# Patient Record
Sex: FEMALE | Race: ASIAN | NOT HISPANIC OR LATINO | URBAN - METROPOLITAN AREA
[De-identification: names, ages, dates, MRNs, and addresses within clinical notes are randomized per-mention and may not be internally consistent; named-entity substitution may affect disease eponyms.]

---

## 2023-07-19 ENCOUNTER — OFFICE VISIT (OUTPATIENT)
Dept: PRIMARY CARE | Facility: CLINIC | Age: 28
End: 2023-07-19
Payer: MEDICAID

## 2023-07-19 VITALS
DIASTOLIC BLOOD PRESSURE: 69 MMHG | BODY MASS INDEX: 24.19 KG/M2 | HEIGHT: 59 IN | RESPIRATION RATE: 16 BRPM | OXYGEN SATURATION: 95 % | WEIGHT: 120 LBS | HEART RATE: 80 BPM | SYSTOLIC BLOOD PRESSURE: 107 MMHG | TEMPERATURE: 97.6 F

## 2023-07-19 DIAGNOSIS — J01.00 ACUTE NON-RECURRENT MAXILLARY SINUSITIS: Primary | ICD-10-CM

## 2023-07-19 DIAGNOSIS — E55.9 VITAMIN D DEFICIENCY: ICD-10-CM

## 2023-07-19 DIAGNOSIS — E53.8 VITAMIN B 12 DEFICIENCY: ICD-10-CM

## 2023-07-19 DIAGNOSIS — Z00.00 ENCOUNTER FOR ANNUAL GENERAL MEDICAL EXAMINATION WITHOUT ABNORMAL FINDINGS IN ADULT: ICD-10-CM

## 2023-07-19 DIAGNOSIS — R53.83 FATIGUE, UNSPECIFIED TYPE: ICD-10-CM

## 2023-07-19 DIAGNOSIS — D64.9 ANEMIA, UNSPECIFIED TYPE: ICD-10-CM

## 2023-07-19 PROBLEM — N92.6 IRREGULAR MENSTRUAL CYCLE: Status: ACTIVE | Noted: 2023-07-19

## 2023-07-19 PROBLEM — J30.9 ALLERGIC RHINITIS: Status: ACTIVE | Noted: 2023-07-19

## 2023-07-19 PROBLEM — L25.9 CONTACT DERMATITIS: Status: ACTIVE | Noted: 2023-07-19

## 2023-07-19 PROBLEM — K21.00 GASTROESOPHAGEAL REFLUX DISEASE WITH ESOPHAGITIS: Status: ACTIVE | Noted: 2023-07-19

## 2023-07-19 PROCEDURE — 99214 OFFICE O/P EST MOD 30 MIN: CPT | Performed by: FAMILY MEDICINE

## 2023-07-19 PROCEDURE — 96372 THER/PROPH/DIAG INJ SC/IM: CPT | Performed by: FAMILY MEDICINE

## 2023-07-19 PROCEDURE — 1036F TOBACCO NON-USER: CPT | Performed by: FAMILY MEDICINE

## 2023-07-19 RX ORDER — AMOXICILLIN AND CLAVULANATE POTASSIUM 875; 125 MG/1; MG/1
875 TABLET, FILM COATED ORAL 2 TIMES DAILY
Qty: 20 TABLET | Refills: 0 | Status: SHIPPED | OUTPATIENT
Start: 2023-07-19 | End: 2023-07-29

## 2023-07-19 RX ORDER — CYANOCOBALAMIN 1000 UG/ML
1000 INJECTION, SOLUTION INTRAMUSCULAR; SUBCUTANEOUS ONCE
Status: COMPLETED | OUTPATIENT
Start: 2023-07-19 | End: 2023-07-19

## 2023-07-19 RX ORDER — ACETAMINOPHEN, DEXTROMETHORPHAN HYDROBROMIDE, GUAIFENESIN, AND PHENYLEPHRINE HYDROCHLORIDE 325; 10; 200; 5 MG/1; MG/1; MG/1; MG/1
1 CAPSULE, LIQUID FILLED ORAL EVERY 6 HOURS PRN
Qty: 20 CAPSULE | Refills: 0 | Status: SHIPPED | OUTPATIENT
Start: 2023-07-19 | End: 2023-08-23 | Stop reason: ALTCHOICE

## 2023-07-19 RX ADMIN — CYANOCOBALAMIN 1000 MCG: 1000 INJECTION, SOLUTION INTRAMUSCULAR; SUBCUTANEOUS at 15:13

## 2023-07-19 ASSESSMENT — ENCOUNTER SYMPTOMS
SHORTNESS OF BREATH: 0
VOICE CHANGE: 0
WHEEZING: 0
MYALGIAS: 0
ARTHRALGIAS: 0
APPETITE CHANGE: 0
PALPITATIONS: 0
UNEXPECTED WEIGHT CHANGE: 0
EYE REDNESS: 0
CONSTIPATION: 0
ACTIVITY CHANGE: 0
DIAPHORESIS: 0
BLOOD IN STOOL: 0
FREQUENCY: 0
NECK STIFFNESS: 0
HEMATURIA: 0
WOUND: 0
SLEEP DISTURBANCE: 0
HEADACHES: 1
DIARRHEA: 0
SINUS PAIN: 0
ABDOMINAL PAIN: 0
SINUS PRESSURE: 0
POLYPHAGIA: 0
DIZZINESS: 0
POLYDIPSIA: 0
EYE ITCHING: 0
FLANK PAIN: 0
EYE DISCHARGE: 0
BACK PAIN: 0
CHILLS: 0
JOINT SWELLING: 0
SORE THROAT: 0
AGITATION: 0
LOSS OF SENSATION IN FEET: 0
DEPRESSION: 0
CHEST TIGHTNESS: 0
FEVER: 0
TROUBLE SWALLOWING: 0
COLOR CHANGE: 0
NAUSEA: 0
BRUISES/BLEEDS EASILY: 0
EYE PAIN: 0
ADENOPATHY: 0
NERVOUS/ANXIOUS: 0
VOMITING: 0
COUGH: 0
FATIGUE: 1
DYSURIA: 0
OCCASIONAL FEELINGS OF UNSTEADINESS: 0
FACIAL SWELLING: 0
RHINORRHEA: 0

## 2023-07-19 ASSESSMENT — PATIENT HEALTH QUESTIONNAIRE - PHQ9
SUM OF ALL RESPONSES TO PHQ9 QUESTIONS 1 AND 2: 0
1. LITTLE INTEREST OR PLEASURE IN DOING THINGS: NOT AT ALL
2. FEELING DOWN, DEPRESSED OR HOPELESS: NOT AT ALL

## 2023-07-19 NOTE — PATIENT INSTRUCTIONS
"Return in 3-4 weeks for physical exam, Pap, & B12 injection.  Return in 3-4 weeks for blood work, no food for 10 hours before.     Take warm, steamy showers to help with sinus congestion.  Try \"neti pot\" at drugstore to help with sinus congestion.     "

## 2023-07-19 NOTE — PROGRESS NOTES
Subjective   Patient ID: Mckinley De La Rosa is a 28 y.o. female who presents for Headache and Fatigue.  Today she is accompanied by accompanied by sibling.     Over the last 2 weeks she has felt tired no matter how much she sleeps. She has also been experiencing headaches and neck pain, relieved by tylenol. Denies fever, sore throat, cough, congestion, diarrhea. Denies recent contacts.  Today she feels better. Denies any medication changes or life/situation changes. No new stressors.     Headache   This is a new problem. The current episode started 1 to 4 weeks ago. The problem occurs daily. The problem has been gradually improving. The pain is located in the Occipital region. The pain does not radiate. The pain quality is similar to prior headaches. The quality of the pain is described as aching. The pain is at a severity of 5/10. The pain is mild. Pertinent negatives include no abdominal pain, back pain, coughing, dizziness, ear pain, eye pain, eye redness, fever, hearing loss, nausea, rhinorrhea, sinus pressure, sore throat, tinnitus or vomiting.   Fatigue  Associated symptoms include fatigue and headaches. Pertinent negatives include no abdominal pain, arthralgias, chest pain, chills, congestion, coughing, diaphoresis, fever, joint swelling, myalgias, nausea, rash, sore throat or vomiting.       Review of Systems   Constitutional:  Positive for fatigue. Negative for activity change, appetite change, chills, diaphoresis, fever and unexpected weight change.   HENT:  Negative for congestion, dental problem, drooling, ear discharge, ear pain, facial swelling, hearing loss, nosebleeds, postnasal drip, rhinorrhea, sinus pressure, sinus pain, sneezing, sore throat, tinnitus, trouble swallowing and voice change.    Eyes:  Negative for pain, discharge, redness, itching and visual disturbance.   Respiratory:  Negative for cough, chest tightness, shortness of breath and wheezing.    Cardiovascular:  Negative for chest pain,  "palpitations and leg swelling.   Gastrointestinal:  Negative for abdominal pain, blood in stool, constipation, diarrhea, nausea and vomiting.   Endocrine: Negative for cold intolerance, heat intolerance, polydipsia, polyphagia and polyuria.   Genitourinary:  Negative for decreased urine volume, dysuria, flank pain, frequency, hematuria and urgency.   Musculoskeletal:  Negative for arthralgias, back pain, gait problem, joint swelling, myalgias and neck stiffness.   Skin:  Negative for color change, pallor, rash and wound.   Neurological:  Positive for headaches. Negative for dizziness.   Hematological:  Negative for adenopathy. Does not bruise/bleed easily.   Psychiatric/Behavioral:  Negative for agitation, behavioral problems and sleep disturbance. The patient is not nervous/anxious.        Objective   Blood Pressure 107/69   Pulse 80   Temperature 36.4 °C (97.6 °F)   Respiration 16   Height 1.499 m (4' 11\")   Weight 54.4 kg (120 lb)   Oxygen Saturation 95%   Body Mass Index 24.24 kg/m²   BSA: 1.5 meters squared  Growth percentiles: Facility age limit for growth %jose r is 20 years. Facility age limit for growth %jsoe r is 20 years.     Physical Exam  Vitals and nursing note reviewed.   Constitutional:       General: She is not in acute distress.     Appearance: Normal appearance. She is normal weight. She is not ill-appearing.   HENT:      Head: Normocephalic.      Comments: Maxillary sinus pressure     Right Ear: Tympanic membrane, ear canal and external ear normal.      Left Ear: Tympanic membrane, ear canal and external ear normal.      Nose: Rhinorrhea present.      Mouth/Throat:      Mouth: Mucous membranes are moist.      Pharynx: Oropharynx is clear. Posterior oropharyngeal erythema present.   Eyes:      Extraocular Movements: Extraocular movements intact.      Conjunctiva/sclera: Conjunctivae normal.      Pupils: Pupils are equal, round, and reactive to light.   Cardiovascular:      Rate and Rhythm: " Normal rate and regular rhythm.      Pulses: Normal pulses.      Heart sounds: Normal heart sounds.   Pulmonary:      Effort: Pulmonary effort is normal. No respiratory distress.      Breath sounds: Normal breath sounds. No wheezing.   Abdominal:      General: Abdomen is flat. Bowel sounds are normal.      Palpations: Abdomen is soft.      Tenderness: There is no abdominal tenderness. There is no guarding or rebound.      Hernia: No hernia is present.   Musculoskeletal:         General: Normal range of motion.      Cervical back: Normal range of motion and neck supple. No rigidity or tenderness.      Right lower leg: No edema.      Left lower leg: No edema.   Lymphadenopathy:      Cervical: Cervical adenopathy (right-sided) present.   Skin:     General: Skin is warm and dry.      Capillary Refill: Capillary refill takes more than 3 seconds.   Neurological:      General: No focal deficit present.      Mental Status: She is alert and oriented to person, place, and time.      Sensory: No sensory deficit.      Motor: No weakness.      Coordination: Coordination normal.   Psychiatric:         Mood and Affect: Mood normal.         Behavior: Behavior normal.         Thought Content: Thought content normal.         Judgment: Judgment normal.         Assessment/Plan   Problem List Items Addressed This Visit       Anemia    Relevant Orders    Ferritin    Folate    Iron and TIBC    POCT Fecal occult immunoassay-ifob manually resulted    Reticulocytes     Other Visit Diagnoses       Acute non-recurrent maxillary sinusitis    -  Primary    Relevant Medications    amoxicillin-pot clavulanate (Augmentin) 875-125 mg tablet    mbatjjfds-IB-rozajype-guaifen (Mucinex Fast-Max Cold-Flu) 5--200 mg capsule    Vitamin D deficiency        Relevant Orders    Vitamin D, Total    Vitamin B 12 deficiency        Relevant Medications    cyanocobalamin (Vitamin B-12) injection 1,000 mcg (Start on 7/19/2023  2:45 PM)    Other Relevant Orders  "   Vitamin B12    Fatigue, unspecified type        Relevant Orders    TSH with reflex to Free T4 if abnormal    Encounter for annual general medical examination without abnormal findings in adult        Relevant Orders    HIV 1/2 Antigen/Antibody Screen with Reflex to Confirmation    Hepatitis C Antibody    Lipid Panel    CBC and Auto Differential    Comprehensive Metabolic Panel        Return in 3-4 weeks for physical exam, Pap, & B12 injection.  Return in 3-4 weeks for blood work, no food for 10 hours before.     Take warm, steamy showers to help with sinus congestion.  Try \"neti pot\" at drugstore to help with sinus congestion.   "

## 2023-08-23 ENCOUNTER — LAB (OUTPATIENT)
Dept: LAB | Facility: LAB | Age: 28
End: 2023-08-23
Payer: MEDICAID

## 2023-08-23 ENCOUNTER — OFFICE VISIT (OUTPATIENT)
Dept: PRIMARY CARE | Facility: CLINIC | Age: 28
End: 2023-08-23
Payer: MEDICAID

## 2023-08-23 VITALS
WEIGHT: 118 LBS | OXYGEN SATURATION: 97 % | SYSTOLIC BLOOD PRESSURE: 103 MMHG | HEART RATE: 83 BPM | TEMPERATURE: 97.6 F | BODY MASS INDEX: 23.79 KG/M2 | DIASTOLIC BLOOD PRESSURE: 71 MMHG | RESPIRATION RATE: 16 BRPM | HEIGHT: 59 IN

## 2023-08-23 DIAGNOSIS — E53.8 VITAMIN B 12 DEFICIENCY: ICD-10-CM

## 2023-08-23 DIAGNOSIS — Z12.4 PAP SMEAR FOR CERVICAL CANCER SCREENING: ICD-10-CM

## 2023-08-23 DIAGNOSIS — D64.9 ANEMIA, UNSPECIFIED TYPE: ICD-10-CM

## 2023-08-23 DIAGNOSIS — Z00.00 ENCOUNTER FOR ANNUAL GENERAL MEDICAL EXAMINATION WITHOUT ABNORMAL FINDINGS IN ADULT: Primary | ICD-10-CM

## 2023-08-23 DIAGNOSIS — E55.9 VITAMIN D DEFICIENCY: ICD-10-CM

## 2023-08-23 DIAGNOSIS — R53.83 FATIGUE, UNSPECIFIED TYPE: ICD-10-CM

## 2023-08-23 DIAGNOSIS — E53.8 VITAMIN B12 DEFICIENCY: ICD-10-CM

## 2023-08-23 DIAGNOSIS — Z00.00 ENCOUNTER FOR ANNUAL GENERAL MEDICAL EXAMINATION WITHOUT ABNORMAL FINDINGS IN ADULT: ICD-10-CM

## 2023-08-23 DIAGNOSIS — Z12.4 ENCOUNTER FOR PAPANICOLAOU SMEAR FOR CERVICAL CANCER SCREENING: ICD-10-CM

## 2023-08-23 DIAGNOSIS — J30.9 ALLERGIC RHINITIS, UNSPECIFIED SEASONALITY, UNSPECIFIED TRIGGER: ICD-10-CM

## 2023-08-23 PROBLEM — L25.9 CONTACT DERMATITIS: Status: RESOLVED | Noted: 2023-07-19 | Resolved: 2023-08-23

## 2023-08-23 PROBLEM — N92.6 IRREGULAR MENSTRUAL CYCLE: Status: RESOLVED | Noted: 2023-07-19 | Resolved: 2023-08-23

## 2023-08-23 LAB
ALANINE AMINOTRANSFERASE (SGPT) (U/L) IN SER/PLAS: 10 U/L (ref 7–45)
ALBUMIN (G/DL) IN SER/PLAS: 4.5 G/DL (ref 3.4–5)
ALKALINE PHOSPHATASE (U/L) IN SER/PLAS: 51 U/L (ref 33–110)
ANION GAP IN SER/PLAS: 13 MMOL/L (ref 10–20)
ASPARTATE AMINOTRANSFERASE (SGOT) (U/L) IN SER/PLAS: 15 U/L (ref 9–39)
BASOPHILS (10*3/UL) IN BLOOD BY AUTOMATED COUNT: 0.02 X10E9/L (ref 0–0.1)
BASOPHILS/100 LEUKOCYTES IN BLOOD BY AUTOMATED COUNT: 0.4 % (ref 0–2)
BILIRUBIN TOTAL (MG/DL) IN SER/PLAS: 0.5 MG/DL (ref 0–1.2)
CALCIDIOL (25 OH VITAMIN D3) (NG/ML) IN SER/PLAS: 22 NG/ML
CALCIUM (MG/DL) IN SER/PLAS: 9.6 MG/DL (ref 8.6–10.6)
CARBON DIOXIDE, TOTAL (MMOL/L) IN SER/PLAS: 25 MMOL/L (ref 21–32)
CHLORIDE (MMOL/L) IN SER/PLAS: 104 MMOL/L (ref 98–107)
CHOLESTEROL (MG/DL) IN SER/PLAS: 167 MG/DL (ref 0–199)
CHOLESTEROL IN HDL (MG/DL) IN SER/PLAS: 52.9 MG/DL
CHOLESTEROL/HDL RATIO: 3.2
COBALAMIN (VITAMIN B12) (PG/ML) IN SER/PLAS: 649 PG/ML (ref 211–911)
CREATININE (MG/DL) IN SER/PLAS: 0.49 MG/DL (ref 0.5–1.05)
EOSINOPHILS (10*3/UL) IN BLOOD BY AUTOMATED COUNT: 0.11 X10E9/L (ref 0–0.7)
EOSINOPHILS/100 LEUKOCYTES IN BLOOD BY AUTOMATED COUNT: 2 % (ref 0–6)
ERYTHROCYTE DISTRIBUTION WIDTH (RATIO) BY AUTOMATED COUNT: 15.3 % (ref 11.5–14.5)
ERYTHROCYTE MEAN CORPUSCULAR HEMOGLOBIN CONCENTRATION (G/DL) BY AUTOMATED: 30.8 G/DL (ref 32–36)
ERYTHROCYTE MEAN CORPUSCULAR VOLUME (FL) BY AUTOMATED COUNT: 87 FL (ref 80–100)
ERYTHROCYTES (10*6/UL) IN BLOOD BY AUTOMATED COUNT: 4.6 X10E12/L (ref 4–5.2)
FERRITIN (UG/LL) IN SER/PLAS: 18 UG/L (ref 8–150)
FOLATE (NG/ML) IN SER/PLAS: 10.5 NG/ML
GFR FEMALE: >90 ML/MIN/1.73M2
GLUCOSE (MG/DL) IN SER/PLAS: 92 MG/DL (ref 74–99)
HEMATOCRIT (%) IN BLOOD BY AUTOMATED COUNT: 40.2 % (ref 36–46)
HEMOGLOBIN (G/DL) IN BLOOD: 12.4 G/DL (ref 12–16)
HEMOGLOBIN (PG) IN RETICULOCYTES: 32 PG (ref 28–38)
HEPATITIS C VIRUS AB PRESENCE IN SERUM: NONREACTIVE
HIV 1/ 2 AG/AB SCREEN: NONREACTIVE
IMMATURE GRANULOCYTES/100 LEUKOCYTES IN BLOOD BY AUTOMATED COUNT: 0.2 % (ref 0–0.9)
IMMATURE RETIC FRACTION: 13.8 % (ref 0–16)
IRON (UG/DL) IN SER/PLAS: 67 UG/DL (ref 35–150)
IRON BINDING CAPACITY (UG/DL) IN SER/PLAS: 421 UG/DL (ref 240–445)
IRON SATURATION (%) IN SER/PLAS: 16 % (ref 25–45)
LDL: 92 MG/DL (ref 0–99)
LEUKOCYTES (10*3/UL) IN BLOOD BY AUTOMATED COUNT: 5.5 X10E9/L (ref 4.4–11.3)
LYMPHOCYTES (10*3/UL) IN BLOOD BY AUTOMATED COUNT: 2.29 X10E9/L (ref 1.2–4.8)
LYMPHOCYTES/100 LEUKOCYTES IN BLOOD BY AUTOMATED COUNT: 41.3 % (ref 13–44)
MONOCYTES (10*3/UL) IN BLOOD BY AUTOMATED COUNT: 0.34 X10E9/L (ref 0.1–1)
MONOCYTES/100 LEUKOCYTES IN BLOOD BY AUTOMATED COUNT: 6.1 % (ref 2–10)
NEUTROPHILS (10*3/UL) IN BLOOD BY AUTOMATED COUNT: 2.77 X10E9/L (ref 1.2–7.7)
NEUTROPHILS/100 LEUKOCYTES IN BLOOD BY AUTOMATED COUNT: 50 % (ref 40–80)
NRBC (PER 100 WBCS) BY AUTOMATED COUNT: 0 /100 WBC (ref 0–0)
PLATELETS (10*3/UL) IN BLOOD AUTOMATED COUNT: 363 X10E9/L (ref 150–450)
POTASSIUM (MMOL/L) IN SER/PLAS: 4.1 MMOL/L (ref 3.5–5.3)
PROTEIN TOTAL: 7.7 G/DL (ref 6.4–8.2)
RETICULOCYTES (10*3/UL) IN BLOOD: 0.04 X10E12/L (ref 0.02–0.08)
RETICULOCYTES/100 ERYTHROCYTES IN BLOOD BY AUTOMATED COUNT: 1 % (ref 0.5–2)
SODIUM (MMOL/L) IN SER/PLAS: 138 MMOL/L (ref 136–145)
THYROTROPIN (MIU/L) IN SER/PLAS BY DETECTION LIMIT <= 0.05 MIU/L: 3.81 MIU/L (ref 0.44–3.98)
TRIGLYCERIDE (MG/DL) IN SER/PLAS: 112 MG/DL (ref 0–149)
UREA NITROGEN (MG/DL) IN SER/PLAS: 11 MG/DL (ref 6–23)
VLDL: 22 MG/DL (ref 0–40)

## 2023-08-23 PROCEDURE — 36415 COLL VENOUS BLD VENIPUNCTURE: CPT

## 2023-08-23 PROCEDURE — 80061 LIPID PANEL: CPT

## 2023-08-23 PROCEDURE — 87624 HPV HI-RISK TYP POOLED RSLT: CPT

## 2023-08-23 PROCEDURE — 82607 VITAMIN B-12: CPT

## 2023-08-23 PROCEDURE — 88175 CYTOPATH C/V AUTO FLUID REDO: CPT

## 2023-08-23 PROCEDURE — 83550 IRON BINDING TEST: CPT

## 2023-08-23 PROCEDURE — 87389 HIV-1 AG W/HIV-1&-2 AB AG IA: CPT

## 2023-08-23 PROCEDURE — 82306 VITAMIN D 25 HYDROXY: CPT

## 2023-08-23 PROCEDURE — 85025 COMPLETE CBC W/AUTO DIFF WBC: CPT

## 2023-08-23 PROCEDURE — 85045 AUTOMATED RETICULOCYTE COUNT: CPT

## 2023-08-23 PROCEDURE — 82746 ASSAY OF FOLIC ACID SERUM: CPT

## 2023-08-23 PROCEDURE — 99395 PREV VISIT EST AGE 18-39: CPT | Performed by: FAMILY MEDICINE

## 2023-08-23 PROCEDURE — 86803 HEPATITIS C AB TEST: CPT

## 2023-08-23 PROCEDURE — 84443 ASSAY THYROID STIM HORMONE: CPT

## 2023-08-23 PROCEDURE — 82728 ASSAY OF FERRITIN: CPT

## 2023-08-23 PROCEDURE — 83540 ASSAY OF IRON: CPT

## 2023-08-23 PROCEDURE — 96372 THER/PROPH/DIAG INJ SC/IM: CPT | Performed by: FAMILY MEDICINE

## 2023-08-23 PROCEDURE — 80053 COMPREHEN METABOLIC PANEL: CPT

## 2023-08-23 PROCEDURE — 1036F TOBACCO NON-USER: CPT | Performed by: FAMILY MEDICINE

## 2023-08-23 RX ORDER — CYANOCOBALAMIN 1000 UG/ML
1000 INJECTION, SOLUTION INTRAMUSCULAR; SUBCUTANEOUS ONCE
Status: COMPLETED | OUTPATIENT
Start: 2023-08-23 | End: 2023-08-23

## 2023-08-23 RX ADMIN — CYANOCOBALAMIN 1000 MCG: 1000 INJECTION, SOLUTION INTRAMUSCULAR; SUBCUTANEOUS at 08:45

## 2023-08-23 ASSESSMENT — ENCOUNTER SYMPTOMS
FREQUENCY: 0
BACK PAIN: 0
DIZZINESS: 0
RHINORRHEA: 0
ACTIVITY CHANGE: 0
JOINT SWELLING: 0
COLOR CHANGE: 0
UNEXPECTED WEIGHT CHANGE: 0
MYALGIAS: 0
FACIAL SWELLING: 0
HEMATURIA: 0
EYE ITCHING: 0
EYE PAIN: 0
DIAPHORESIS: 0
SINUS PRESSURE: 0
FATIGUE: 0
POLYDIPSIA: 0
NECK STIFFNESS: 0
BRUISES/BLEEDS EASILY: 0
WOUND: 0
APPETITE CHANGE: 0
SHORTNESS OF BREATH: 0
NAUSEA: 0
VOICE CHANGE: 0
COUGH: 0
AGITATION: 0
SORE THROAT: 0
FEVER: 0
DIARRHEA: 0
CHILLS: 0
CONSTIPATION: 0
ABDOMINAL PAIN: 0
POLYPHAGIA: 0
WHEEZING: 0
ARTHRALGIAS: 0
VOMITING: 0
DYSURIA: 0
SLEEP DISTURBANCE: 0
NERVOUS/ANXIOUS: 0
EYE DISCHARGE: 0
DEPRESSION: 0
ADENOPATHY: 0
FLANK PAIN: 0
TROUBLE SWALLOWING: 0
LOSS OF SENSATION IN FEET: 0
BLOOD IN STOOL: 0
OCCASIONAL FEELINGS OF UNSTEADINESS: 0
SINUS PAIN: 0
EYE REDNESS: 0
PALPITATIONS: 0
CHEST TIGHTNESS: 0

## 2023-08-23 ASSESSMENT — PATIENT HEALTH QUESTIONNAIRE - PHQ9
2. FEELING DOWN, DEPRESSED OR HOPELESS: NOT AT ALL
SUM OF ALL RESPONSES TO PHQ9 QUESTIONS 1 AND 2: 0
1. LITTLE INTEREST OR PLEASURE IN DOING THINGS: NOT AT ALL

## 2023-08-23 NOTE — PROGRESS NOTES
Subjective   Patient ID: Mckinley De La Rosa is a 28 y.o. female who presents for Annual Exam (With Pap Smear).  Today she is accompanied by alone.     Well Adult Physical   Patient here for a comprehensive physical exam.The patient reports no problems    Do you take any herbs or supplements that were not prescribed by a doctor? no   Are you taking calcium supplements? no   Are you taking aspirin daily? no     History:  LMP: Patient's last menstrual period was 2023 (approximate).  Last pap date:   Abnormal pap? no  : 0              Review of Systems   Constitutional:  Negative for activity change, appetite change, chills, diaphoresis, fatigue, fever and unexpected weight change.   HENT:  Negative for congestion, dental problem, drooling, ear discharge, ear pain, facial swelling, hearing loss, nosebleeds, postnasal drip, rhinorrhea, sinus pressure, sinus pain, sneezing, sore throat, tinnitus, trouble swallowing and voice change.    Eyes:  Negative for pain, discharge, redness, itching and visual disturbance.   Respiratory:  Negative for cough, chest tightness, shortness of breath and wheezing.    Cardiovascular:  Negative for chest pain, palpitations and leg swelling.   Gastrointestinal:  Negative for abdominal pain, blood in stool, constipation, diarrhea, nausea and vomiting.   Endocrine: Negative for cold intolerance, heat intolerance, polydipsia, polyphagia and polyuria.   Genitourinary:  Negative for decreased urine volume, dysuria, flank pain, frequency, hematuria and urgency.   Musculoskeletal:  Negative for arthralgias, back pain, gait problem, joint swelling, myalgias and neck stiffness.   Skin:  Negative for color change, pallor, rash and wound.   Neurological:  Negative for dizziness.   Hematological:  Negative for adenopathy. Does not bruise/bleed easily.   Psychiatric/Behavioral:  Negative for agitation, behavioral problems and sleep disturbance. The patient is not nervous/anxious.   "      Objective   Blood Pressure 103/71 (BP Location: Right arm, Patient Position: Sitting, BP Cuff Size: Small adult)   Pulse 83   Temperature 36.4 °C (97.6 °F)   Respiration 16   Height 1.499 m (4' 11\")   Weight 53.5 kg (118 lb)   Last Menstrual Period 08/05/2023 (Approximate)   Oxygen Saturation 97%   Body Mass Index 23.83 kg/m²   BSA: 1.49 meters squared  Growth percentiles: Facility age limit for growth %jose r is 20 years. Facility age limit for growth %jose r is 20 years.     Physical Exam  Vitals and nursing note reviewed. Exam conducted with a chaperone present.   Constitutional:       General: She is not in acute distress.     Appearance: Normal appearance. She is normal weight. She is not ill-appearing, toxic-appearing or diaphoretic.   HENT:      Head: Normocephalic.      Right Ear: Tympanic membrane, ear canal and external ear normal. There is no impacted cerumen.      Left Ear: Tympanic membrane, ear canal and external ear normal. There is no impacted cerumen.      Nose: Nose normal. No rhinorrhea.      Mouth/Throat:      Mouth: Mucous membranes are moist.      Pharynx: Oropharynx is clear. No oropharyngeal exudate or posterior oropharyngeal erythema.   Eyes:      General: No scleral icterus.        Right eye: No discharge.         Left eye: No discharge.      Extraocular Movements: Extraocular movements intact.      Conjunctiva/sclera: Conjunctivae normal.      Pupils: Pupils are equal, round, and reactive to light.   Neck:      Vascular: No carotid bruit.   Cardiovascular:      Rate and Rhythm: Normal rate and regular rhythm.      Pulses: Normal pulses.      Heart sounds: Normal heart sounds. No murmur heard.  Pulmonary:      Effort: Pulmonary effort is normal. No respiratory distress.      Breath sounds: Normal breath sounds. No wheezing or rhonchi.   Chest:      Chest wall: No tenderness.   Breasts:     Breasts are symmetrical.      Right: Normal. No swelling, bleeding, inverted nipple, mass, " nipple discharge, skin change or tenderness.      Left: Normal. No swelling, bleeding, inverted nipple, mass, nipple discharge, skin change or tenderness.   Abdominal:      General: Abdomen is flat. Bowel sounds are normal. There is no distension.      Palpations: Abdomen is soft. There is no mass.      Tenderness: There is no abdominal tenderness. There is no right CVA tenderness, left CVA tenderness, guarding or rebound.      Hernia: No hernia is present.   Genitourinary:     General: Normal vulva.      Vagina: No vaginal discharge.      Rectum: Normal. Guaiac result negative.   Musculoskeletal:         General: No swelling, tenderness, deformity or signs of injury. Normal range of motion.      Cervical back: Normal range of motion and neck supple. No rigidity or tenderness.      Right lower leg: No edema.      Left lower leg: No edema.   Lymphadenopathy:      Cervical: No cervical adenopathy.      Upper Body:      Right upper body: No supraclavicular, axillary or pectoral adenopathy.      Left upper body: No supraclavicular, axillary or pectoral adenopathy.   Skin:     General: Skin is warm and dry.      Capillary Refill: Capillary refill takes more than 3 seconds.      Coloration: Skin is not jaundiced or pale.      Findings: No bruising, erythema, lesion or rash.   Neurological:      General: No focal deficit present.      Mental Status: She is alert and oriented to person, place, and time.      Cranial Nerves: No cranial nerve deficit.      Sensory: No sensory deficit.      Motor: No weakness.      Coordination: Coordination normal.      Gait: Gait normal.      Deep Tendon Reflexes: Reflexes normal.   Psychiatric:         Mood and Affect: Mood normal.         Behavior: Behavior normal.         Thought Content: Thought content normal.         Judgment: Judgment normal.         Assessment/Plan   Problem List Items Addressed This Visit       Vitamin B12 deficiency    Relevant Medications    cyanocobalamin  (Vitamin B-12) injection 1,000 mcg (Completed)    Allergic rhinitis     Other Visit Diagnoses       Encounter for annual general medical examination without abnormal findings in adult    -  Primary    Pap smear for cervical cancer screening        Relevant Orders    THINPREP PAP TEST    Encounter for Papanicolaou smear for cervical cancer screening        Relevant Orders    THINPREP PAP TEST          We will call with lab results and Pap results.    Follow-up in 6 months for vitamin B12 deficiency or sooner if any concerns.  Immunizations reviewed  Immunization History   Administered Date(s) Administered    Flu vaccine (IIV4), preservative free *Check age/dose* 09/21/2021    Pfizer Purple Cap SARS-CoV-2 04/11/2021, 05/02/2021     Does need a tetanus shot which can be done at the pharmacy.    No current outpatient medications on file.     No current facility-administered medications for this visit.     Continue with B12 shots monthly with the nurse.

## 2023-08-23 NOTE — PATIENT INSTRUCTIONS
Continue with B12 shots monthly with the nurse.  Does need a tetanus shot which can be done at the pharmacy  We will call with lab results and Pap results.    Follow-up in 6 months for vitamin B12 deficiency or sooner if any concerns.  Immunizations reviewed    No current outpatient medications on file.     Current Facility-Administered Medications   Medication Dose Route Frequency Provider Last Rate Last Admin    cyanocobalamin (Vitamin B-12) injection 1,000 mcg  1,000 mcg subcutaneous Once Ariane Brumfield MD

## 2023-09-05 LAB
COMPLETE PATHOLOGY REPORT: NORMAL
CONVERTED CLINICAL DIAGNOSIS-HISTORY: NORMAL
CONVERTED DIAGNOSIS COMMENT: NORMAL
CONVERTED FINAL DIAGNOSIS: NORMAL
CONVERTED FINAL REPORT PDF LINK TO COPY AND PASTE: NORMAL

## 2023-09-25 ENCOUNTER — CLINICAL SUPPORT (OUTPATIENT)
Dept: PRIMARY CARE | Facility: CLINIC | Age: 28
End: 2023-09-25
Payer: MEDICAID

## 2023-09-25 DIAGNOSIS — E53.8 VITAMIN B 12 DEFICIENCY: ICD-10-CM

## 2023-09-25 PROCEDURE — 96372 THER/PROPH/DIAG INJ SC/IM: CPT | Performed by: FAMILY MEDICINE

## 2023-09-25 RX ORDER — CYANOCOBALAMIN 1000 UG/ML
1000 INJECTION, SOLUTION INTRAMUSCULAR; SUBCUTANEOUS ONCE
Status: COMPLETED | OUTPATIENT
Start: 2023-09-25 | End: 2023-09-25

## 2023-09-25 RX ADMIN — CYANOCOBALAMIN 1000 MCG: 1000 INJECTION, SOLUTION INTRAMUSCULAR; SUBCUTANEOUS at 10:00

## 2023-09-25 NOTE — PROGRESS NOTES
Pt presents for monthly B12 injection per Dr Brumfield. 1 mL given subcutaneously in upper R arm; no issues w/ injection. Pt tolerated well.

## 2023-12-12 ENCOUNTER — OFFICE VISIT (OUTPATIENT)
Dept: PRIMARY CARE | Facility: CLINIC | Age: 28
End: 2023-12-12
Payer: MEDICAID

## 2023-12-12 ENCOUNTER — ANCILLARY PROCEDURE (OUTPATIENT)
Dept: RADIOLOGY | Facility: CLINIC | Age: 28
End: 2023-12-12
Payer: MEDICAID

## 2023-12-12 ENCOUNTER — LAB (OUTPATIENT)
Dept: LAB | Facility: LAB | Age: 28
End: 2023-12-12
Payer: MEDICAID

## 2023-12-12 VITALS
WEIGHT: 128.5 LBS | HEART RATE: 80 BPM | OXYGEN SATURATION: 97 % | BODY MASS INDEX: 25.95 KG/M2 | SYSTOLIC BLOOD PRESSURE: 123 MMHG | DIASTOLIC BLOOD PRESSURE: 82 MMHG

## 2023-12-12 DIAGNOSIS — N93.9 ABNORMAL VAGINAL BLEEDING: ICD-10-CM

## 2023-12-12 DIAGNOSIS — R10.2 PELVIC PAIN: ICD-10-CM

## 2023-12-12 DIAGNOSIS — M54.2 NECK PAIN, ACUTE: Primary | ICD-10-CM

## 2023-12-12 DIAGNOSIS — D51.9 ANEMIA DUE TO VITAMIN B12 DEFICIENCY, UNSPECIFIED B12 DEFICIENCY TYPE: ICD-10-CM

## 2023-12-12 DIAGNOSIS — N76.0 ACUTE VAGINITIS: ICD-10-CM

## 2023-12-12 DIAGNOSIS — R35.0 FREQUENCY OF URINATION: ICD-10-CM

## 2023-12-12 DIAGNOSIS — D64.9 ANEMIA, UNSPECIFIED TYPE: ICD-10-CM

## 2023-12-12 DIAGNOSIS — M25.512 ACUTE PAIN OF LEFT SHOULDER: ICD-10-CM

## 2023-12-12 DIAGNOSIS — N76.1 CHRONIC VAGINITIS: ICD-10-CM

## 2023-12-12 DIAGNOSIS — E53.8 VITAMIN B 12 DEFICIENCY: ICD-10-CM

## 2023-12-12 DIAGNOSIS — M54.2 NECK PAIN, ACUTE: ICD-10-CM

## 2023-12-12 LAB
POC BILIRUBIN, URINE: NEGATIVE
POC BLOOD, URINE: ABNORMAL
POC GLUCOSE, URINE: NEGATIVE MG/DL
POC KETONES, URINE: NEGATIVE MG/DL
POC LEUKOCYTES, URINE: ABNORMAL
POC NITRITE,URINE: NEGATIVE
POC PH, URINE: 5 PH
POC PROTEIN, URINE: NEGATIVE MG/DL
POC SPECIFIC GRAVITY, URINE: 1.02
POC UROBILINOGEN, URINE: 0.2 EU/DL
PREGNANCY TEST URINE, POC: NEGATIVE

## 2023-12-12 PROCEDURE — 83921 ORGANIC ACID SINGLE QUANT: CPT

## 2023-12-12 PROCEDURE — 1036F TOBACCO NON-USER: CPT | Performed by: FAMILY MEDICINE

## 2023-12-12 PROCEDURE — 99214 OFFICE O/P EST MOD 30 MIN: CPT | Performed by: FAMILY MEDICINE

## 2023-12-12 PROCEDURE — 82607 VITAMIN B-12: CPT

## 2023-12-12 PROCEDURE — 87205 SMEAR GRAM STAIN: CPT

## 2023-12-12 PROCEDURE — 81025 URINE PREGNANCY TEST: CPT | Performed by: FAMILY MEDICINE

## 2023-12-12 PROCEDURE — 87661 TRICHOMONAS VAGINALIS AMPLIF: CPT

## 2023-12-12 PROCEDURE — 76830 TRANSVAGINAL US NON-OB: CPT

## 2023-12-12 PROCEDURE — 81003 URINALYSIS AUTO W/O SCOPE: CPT | Performed by: FAMILY MEDICINE

## 2023-12-12 PROCEDURE — 87800 DETECT AGNT MULT DNA DIREC: CPT

## 2023-12-12 PROCEDURE — 85025 COMPLETE CBC W/AUTO DIFF WBC: CPT

## 2023-12-12 PROCEDURE — 76830 TRANSVAGINAL US NON-OB: CPT | Performed by: RADIOLOGY

## 2023-12-12 PROCEDURE — 80053 COMPREHEN METABOLIC PANEL: CPT

## 2023-12-12 PROCEDURE — 87086 URINE CULTURE/COLONY COUNT: CPT

## 2023-12-12 PROCEDURE — 36415 COLL VENOUS BLD VENIPUNCTURE: CPT

## 2023-12-12 RX ORDER — CYCLOBENZAPRINE HCL 10 MG
TABLET ORAL
Qty: 20 TABLET | Refills: 0 | Status: CANCELLED | OUTPATIENT
Start: 2023-12-12

## 2023-12-12 RX ORDER — CYCLOBENZAPRINE HCL 10 MG
10 TABLET ORAL NIGHTLY PRN
Qty: 20 TABLET | Refills: 0 | Status: SHIPPED | OUTPATIENT
Start: 2023-12-12 | End: 2024-01-17 | Stop reason: ALTCHOICE

## 2023-12-12 RX ORDER — METHYLPREDNISOLONE 4 MG/1
TABLET ORAL
Qty: 21 TABLET | Refills: 0 | Status: SHIPPED | OUTPATIENT
Start: 2023-12-12 | End: 2023-12-19

## 2023-12-12 RX ORDER — METHYLPREDNISOLONE 4 MG/1
TABLET ORAL
Qty: 21 TABLET | Refills: 0 | Status: CANCELLED | OUTPATIENT
Start: 2023-12-12 | End: 2023-12-19

## 2023-12-12 ASSESSMENT — ENCOUNTER SYMPTOMS
NECK PAIN: 1
HEMATURIA: 0
BACK PAIN: 1
DIFFICULTY URINATING: 0
DYSURIA: 0
FREQUENCY: 0

## 2023-12-12 NOTE — PROGRESS NOTES
Subjective   Patient ID: Mckinley De La Rosa is a 28 y.o. female who presents for Neck Pain (Left side. 2 weeks ) and Menstrual Problem (Having period every 2 weeks).  Today she is accompanied by alone.     27 y/o female presents for neck pain. Patient reports that she has left sided neck pain that goes from her posterior left neck to her left shoulder, worsened with exertion and turning her neck, onset 2 weeks ago. It also occasionally radiates into her left jaw with pins and needles. She denies any injury, exertion, sleeping changes. She also complains that she has been having a period about every 2 weeks for the last 2 months. Her LMP was 12/5 and her one before that was 11/20. Her normal periods are about every 25 days and heavy. Currently, she is on day 7 of her current period and reports that it has been light. She also notes that she feels some vaginal burning at rest, worsened with sitting. She admits that she was sexually active with one male partner about 2 months ago, not consistent with protection. She has not taken anything for her pain. Patient reports some intermittent burning left lower ABD pain but denies urinary frequency, burning with urination, hematuria, dysuria or any other complaints at this time.     Neck Pain         Review of Systems   Genitourinary:  Positive for vaginal pain. Negative for difficulty urinating, dysuria, frequency, hematuria and urgency.   Musculoskeletal:  Positive for back pain and neck pain.   All other systems reviewed and are negative.      Objective   Blood Pressure 123/82   Pulse 80   Weight 58.3 kg (128 lb 8 oz)   Oxygen Saturation 97%   Body Mass Index 25.95 kg/m²   BSA: 1.56 meters squared  Growth percentiles: Facility age limit for growth %jose r is 20 years. Facility age limit for growth %jose r is 20 years.     Physical Exam  Vitals and nursing note reviewed.   Constitutional:       General: She is not in acute distress.     Appearance: Normal appearance. She is normal  weight. She is not ill-appearing or toxic-appearing.   HENT:      Head: Normocephalic.      Right Ear: Tympanic membrane, ear canal and external ear normal. There is no impacted cerumen.      Left Ear: Tympanic membrane, ear canal and external ear normal. There is no impacted cerumen.      Nose: Nose normal. No congestion or rhinorrhea.      Mouth/Throat:      Mouth: Mucous membranes are moist.      Pharynx: Oropharynx is clear. No oropharyngeal exudate or posterior oropharyngeal erythema.   Eyes:      General: No scleral icterus.        Right eye: No discharge.         Left eye: No discharge.      Extraocular Movements: Extraocular movements intact.      Conjunctiva/sclera: Conjunctivae normal.      Pupils: Pupils are equal, round, and reactive to light.   Neck:      Vascular: No carotid bruit.   Cardiovascular:      Rate and Rhythm: Normal rate and regular rhythm.      Pulses: Normal pulses.      Heart sounds: Normal heart sounds. No murmur heard.     No gallop.   Pulmonary:      Effort: Pulmonary effort is normal. No respiratory distress.      Breath sounds: Normal breath sounds. No wheezing or rhonchi.   Chest:      Chest wall: No tenderness.   Abdominal:      General: Abdomen is flat. Bowel sounds are normal. There is no distension.      Palpations: Abdomen is soft. There is no mass.      Tenderness: There is abdominal tenderness. There is guarding. There is no rebound.      Hernia: No hernia is present.   Musculoskeletal:         General: No swelling.      Cervical back: Spasms and tenderness present. No rigidity. Decreased range of motion.      Right lower leg: No edema.      Left lower leg: No edema.   Lymphadenopathy:      Cervical: No cervical adenopathy.   Skin:     General: Skin is warm and dry.      Coloration: Skin is not pale.      Findings: No bruising, erythema or rash.   Neurological:      General: No focal deficit present.      Mental Status: She is alert and oriented to person, place, and time.       Sensory: No sensory deficit.      Coordination: Coordination normal.      Gait: Gait normal.      Deep Tendon Reflexes: Reflexes normal.   Psychiatric:         Mood and Affect: Mood normal.         Behavior: Behavior normal.         Thought Content: Thought content normal.         Judgment: Judgment normal.         Assessment/Plan   Problem List Items Addressed This Visit    None  Visit Diagnoses       Diagnosis Codes    Neck pain, acute    -  Primary M54.2    Relevant Orders    XR cervical spine complete 4-5 views    Acute pain of left shoulder     M25.512    Frequency of urination     R35.0    Relevant Orders    Urine Culture    POCT UA Automated manually resulted (Completed)    Abnormal vaginal bleeding     N93.9    Relevant Orders    POCT Pregnancy, Urine manually resulted (Completed)    US pelvis transvaginal    Acute vaginitis     N76.0    Relevant Orders    C. trachomatis / N. gonorrhoeae, DNA probe    Trichomonas vaginalis, Amplified    US pelvis transvaginal    Pelvic pain     R10.2    Relevant Orders    US pelvis transvaginal            May need a pelvis US  Will need a vaginal exam to R/O any other causes of vaginal infections due to unprotected intercourse.   Will wait for culture results    Will wait for Xray results, if negative, will refer to PT

## 2023-12-13 LAB
ALBUMIN SERPL BCP-MCNC: 4.5 G/DL (ref 3.4–5)
ALP SERPL-CCNC: 56 U/L (ref 33–110)
ALT SERPL W P-5'-P-CCNC: 11 U/L (ref 7–45)
ANION GAP SERPL CALC-SCNC: 12 MMOL/L (ref 10–20)
AST SERPL W P-5'-P-CCNC: 14 U/L (ref 9–39)
BACTERIAL VAGINOSIS VAG-IMP: NORMAL
BASOPHILS # BLD AUTO: 0.03 X10*3/UL (ref 0–0.1)
BASOPHILS NFR BLD AUTO: 0.5 %
BILIRUB SERPL-MCNC: 0.3 MG/DL (ref 0–1.2)
BUN SERPL-MCNC: 8 MG/DL (ref 6–23)
C TRACH RRNA SPEC QL NAA+PROBE: NEGATIVE
CALCIUM SERPL-MCNC: 9.2 MG/DL (ref 8.6–10.6)
CHLORIDE SERPL-SCNC: 106 MMOL/L (ref 98–107)
CLUE CELLS VAG LPF-#/AREA: NORMAL /[LPF]
CO2 SERPL-SCNC: 25 MMOL/L (ref 21–32)
CREAT SERPL-MCNC: 0.54 MG/DL (ref 0.5–1.05)
EOSINOPHIL # BLD AUTO: 0.09 X10*3/UL (ref 0–0.7)
EOSINOPHIL NFR BLD AUTO: 1.6 %
ERYTHROCYTE [DISTWIDTH] IN BLOOD BY AUTOMATED COUNT: 14.6 % (ref 11.5–14.5)
GFR SERPL CREATININE-BSD FRML MDRD: >90 ML/MIN/1.73M*2
GLUCOSE SERPL-MCNC: 114 MG/DL (ref 74–99)
HCT VFR BLD AUTO: 38.5 % (ref 36–46)
HGB BLD-MCNC: 12.5 G/DL (ref 12–16)
IMM GRANULOCYTES # BLD AUTO: 0.01 X10*3/UL (ref 0–0.7)
IMM GRANULOCYTES NFR BLD AUTO: 0.2 % (ref 0–0.9)
LYMPHOCYTES # BLD AUTO: 1.79 X10*3/UL (ref 1.2–4.8)
LYMPHOCYTES NFR BLD AUTO: 32 %
MCH RBC QN AUTO: 29.1 PG (ref 26–34)
MCHC RBC AUTO-ENTMCNC: 32.5 G/DL (ref 32–36)
MCV RBC AUTO: 90 FL (ref 80–100)
MONOCYTES # BLD AUTO: 0.38 X10*3/UL (ref 0.1–1)
MONOCYTES NFR BLD AUTO: 6.8 %
N GONORRHOEA DNA SPEC QL PROBE+SIG AMP: NEGATIVE
NEUTROPHILS # BLD AUTO: 3.3 X10*3/UL (ref 1.2–7.7)
NEUTROPHILS NFR BLD AUTO: 58.9 %
NRBC BLD-RTO: 0 /100 WBCS (ref 0–0)
NUGENT SCORE: 4
PLATELET # BLD AUTO: 342 X10*3/UL (ref 150–450)
POTASSIUM SERPL-SCNC: 3.8 MMOL/L (ref 3.5–5.3)
PROT SERPL-MCNC: 7.5 G/DL (ref 6.4–8.2)
RBC # BLD AUTO: 4.29 X10*6/UL (ref 4–5.2)
SODIUM SERPL-SCNC: 139 MMOL/L (ref 136–145)
T VAGINALIS RRNA SPEC QL NAA+PROBE: NEGATIVE
VIT B12 SERPL-MCNC: 659 PG/ML (ref 211–911)
WBC # BLD AUTO: 5.6 X10*3/UL (ref 4.4–11.3)
YEAST VAG WET PREP-#/AREA: NORMAL

## 2023-12-14 LAB — BACTERIA UR CULT: NORMAL

## 2023-12-14 RX ORDER — MEDROXYPROGESTERONE ACETATE 10 MG/1
10 TABLET ORAL DAILY
Qty: 10 TABLET | Refills: 0 | Status: SHIPPED | OUTPATIENT
Start: 2023-12-14 | End: 2024-03-11 | Stop reason: ALTCHOICE

## 2023-12-14 NOTE — PROGRESS NOTES
Subjective   Patient ID: Mckinley De La Rosa is a 28 y.o. female who presents for No chief complaint on file..  Today she is accompanied by {alone or w :[id number]}.     HPI    Review of Systems    Objective   There were no vitals taken for this visit.  BSA: There is no height or weight on file to calculate BSA.  Growth percentiles: Facility age limit for growth %jose r is 20 years. Facility age limit for growth %jose r is 20 years.     Physical Exam    Assessment/Plan   {Assess/PlanSmartLinks:[id number]}

## 2023-12-16 LAB — METHYLMALONATE SERPL-SCNC: <0.1 UMOL/L (ref 0–0.4)

## 2023-12-18 ENCOUNTER — ANCILLARY PROCEDURE (OUTPATIENT)
Dept: RADIOLOGY | Facility: CLINIC | Age: 28
End: 2023-12-18
Payer: MEDICAID

## 2023-12-18 DIAGNOSIS — M54.2 NECK PAIN, ACUTE: ICD-10-CM

## 2023-12-18 PROCEDURE — 72050 X-RAY EXAM NECK SPINE 4/5VWS: CPT | Performed by: STUDENT IN AN ORGANIZED HEALTH CARE EDUCATION/TRAINING PROGRAM

## 2023-12-18 PROCEDURE — 72050 X-RAY EXAM NECK SPINE 4/5VWS: CPT

## 2024-01-08 ENCOUNTER — CLINICAL SUPPORT (OUTPATIENT)
Dept: PRIMARY CARE | Facility: CLINIC | Age: 29
End: 2024-01-08
Payer: MEDICAID

## 2024-01-08 DIAGNOSIS — D51.9 ANEMIA DUE TO VITAMIN B12 DEFICIENCY, UNSPECIFIED B12 DEFICIENCY TYPE: ICD-10-CM

## 2024-01-08 PROCEDURE — 96372 THER/PROPH/DIAG INJ SC/IM: CPT | Performed by: FAMILY MEDICINE

## 2024-01-08 RX ORDER — CYANOCOBALAMIN 1000 UG/ML
1000 INJECTION, SOLUTION INTRAMUSCULAR; SUBCUTANEOUS ONCE
Status: COMPLETED | OUTPATIENT
Start: 2024-01-08 | End: 2024-01-08

## 2024-01-08 RX ADMIN — CYANOCOBALAMIN 1000 MCG: 1000 INJECTION, SOLUTION INTRAMUSCULAR; SUBCUTANEOUS at 14:13

## 2024-01-08 NOTE — PROGRESS NOTES
Pt presents for monthly B12 injection per Dr Brumfield. 1 mL given subcutaneously in upper L arm; no issues w/ injection. Pt tolerated well.

## 2024-01-17 ENCOUNTER — OFFICE VISIT (OUTPATIENT)
Dept: PRIMARY CARE | Facility: CLINIC | Age: 29
End: 2024-01-17
Payer: MEDICAID

## 2024-01-17 VITALS
OXYGEN SATURATION: 97 % | BODY MASS INDEX: 26.08 KG/M2 | WEIGHT: 129.13 LBS | HEART RATE: 79 BPM | SYSTOLIC BLOOD PRESSURE: 124 MMHG | DIASTOLIC BLOOD PRESSURE: 88 MMHG

## 2024-01-17 DIAGNOSIS — M54.10 RADICULAR PAIN: ICD-10-CM

## 2024-01-17 DIAGNOSIS — M54.2 NECK PAIN, ACUTE: Primary | ICD-10-CM

## 2024-01-17 DIAGNOSIS — M79.601 RIGHT ARM PAIN: ICD-10-CM

## 2024-01-17 PROCEDURE — 99214 OFFICE O/P EST MOD 30 MIN: CPT | Performed by: FAMILY MEDICINE

## 2024-01-17 PROCEDURE — 1036F TOBACCO NON-USER: CPT | Performed by: FAMILY MEDICINE

## 2024-01-17 RX ORDER — METAXALONE 800 MG/1
800 TABLET ORAL 3 TIMES DAILY PRN
Qty: 30 TABLET | Refills: 0 | Status: SHIPPED | OUTPATIENT
Start: 2024-01-17 | End: 2024-03-11 | Stop reason: ALTCHOICE

## 2024-01-17 NOTE — PROGRESS NOTES
Subjective   Patient ID: Mckinley De La Rosa is a 28 y.o. female who presents for Follow-up (Neck x-ray. Has not done PT).  Today she is accompanied by alone.     Neck Pain  Patient complains of neck pain. Event that precipitated these symptoms: none known. Onset of symptoms 3 weeks ago, and have been gradually worsening since that time. Current symptoms are pain in the right side of the neck and into the right arm (aching, throbbing, and tingling in character; 7/10 in severity) and weakness in hand is weak. Patient denies numbness in the right arm and paresthesias in right arm. Patient has had recurrent self limited episodes of neck pain in the past. Previous treatments include: none    .            Review of Systems    Objective   Blood Pressure 124/88   Pulse 79   Weight 58.6 kg (129 lb 2 oz)   Oxygen Saturation 97%   Body Mass Index 26.08 kg/m²   BSA: 1.56 meters squared  Growth percentiles: Facility age limit for growth %jose r is 20 years. Facility age limit for growth %jose r is 20 years.   Objective  Blood Pressure 124/88   Pulse 79   Weight 58.6 kg (129 lb 2 oz)   Oxygen Saturation 97%   Body Mass Index 26.08 kg/m²   General:   alert and oriented, in no acute distress  External Deformity:  absent  ROM Cervical Spine:  normal range of motion, supple, and right lateral bending to 45 degrees  Midline Tenderness:  moderate on the right  Paraspinous tenderness:  moderate on the right  UE Neurologic Exam:  unremarkable, normal strength, normal sensation, normal reflexes    X-ray of the cervical spine: Positive findings: DJD C2, C3, C5, and C6    Assessment/Plan  Cervical pain secondary to degenerative disk disease.  Discussed the cervical pain, its course and treatment.  Educational material distributed.  Discussed appropriate exercises.  Discussed appropriate use of ice and heat.  NSAIDs per medication orders.  OTC analgesics as needed.  PT referral.  Follow up in  2 months.      Assessment/Plan   Problem List Items  Addressed This Visit             ICD-10-CM    Neck pain, acute - Primary M54.2    Relevant Medications    metaxalone (Skelaxin) 800 mg tablet    Right arm pain M79.601    Relevant Medications    metaxalone (Skelaxin) 800 mg tablet     Other Visit Diagnoses       Diagnosis Codes    Radicular pain     M54.10    Relevant Medications    metaxalone (Skelaxin) 800 mg tablet            F/U in 2 months or sooner if no improvement  May need PT  Home exercises sheets given

## 2024-01-17 NOTE — PATIENT INSTRUCTIONS
F/U in 2 months or sooner if no improvement  May need PT  Home exercises sheets given  Educational material distributed.  Discussed appropriate exercises.  Discussed appropriate use of ice and heat.  NSAIDs per medication orders.  OTC analgesics as needed.  PT referral.  Follow up in  2 months.

## 2024-02-05 ENCOUNTER — CLINICAL SUPPORT (OUTPATIENT)
Dept: PRIMARY CARE | Facility: CLINIC | Age: 29
End: 2024-02-05
Payer: MEDICAID

## 2024-02-05 DIAGNOSIS — E53.8 VITAMIN B 12 DEFICIENCY: ICD-10-CM

## 2024-02-05 PROCEDURE — 96372 THER/PROPH/DIAG INJ SC/IM: CPT | Performed by: FAMILY MEDICINE

## 2024-02-05 RX ORDER — CYANOCOBALAMIN 1000 UG/ML
1000 INJECTION, SOLUTION INTRAMUSCULAR; SUBCUTANEOUS ONCE
Status: COMPLETED | OUTPATIENT
Start: 2024-02-05 | End: 2024-02-05

## 2024-02-05 RX ADMIN — CYANOCOBALAMIN 1000 MCG: 1000 INJECTION, SOLUTION INTRAMUSCULAR; SUBCUTANEOUS at 14:11

## 2024-02-05 NOTE — PROGRESS NOTES
Pt presents for routine B12 injection per Dr Brumfield. 1 mL given subcutaneously in upper L arm; no issues w/ injection. Pt tolerated well.

## 2024-02-27 ENCOUNTER — CLINICAL SUPPORT (OUTPATIENT)
Dept: PRIMARY CARE | Facility: CLINIC | Age: 29
End: 2024-02-27
Payer: MEDICAID

## 2024-02-27 DIAGNOSIS — E53.8 VITAMIN B 12 DEFICIENCY: ICD-10-CM

## 2024-02-27 PROCEDURE — 96372 THER/PROPH/DIAG INJ SC/IM: CPT | Performed by: FAMILY MEDICINE

## 2024-02-27 RX ORDER — CYANOCOBALAMIN 1000 UG/ML
1000 INJECTION, SOLUTION INTRAMUSCULAR; SUBCUTANEOUS ONCE
Status: COMPLETED | OUTPATIENT
Start: 2024-02-27 | End: 2024-02-27

## 2024-02-27 RX ADMIN — CYANOCOBALAMIN 1000 MCG: 1000 INJECTION, SOLUTION INTRAMUSCULAR; SUBCUTANEOUS at 08:45

## 2024-03-11 ENCOUNTER — OFFICE VISIT (OUTPATIENT)
Dept: PRIMARY CARE | Facility: CLINIC | Age: 29
End: 2024-03-11
Payer: MEDICAID

## 2024-03-11 VITALS
BODY MASS INDEX: 26.36 KG/M2 | WEIGHT: 130.5 LBS | DIASTOLIC BLOOD PRESSURE: 73 MMHG | OXYGEN SATURATION: 96 % | SYSTOLIC BLOOD PRESSURE: 105 MMHG | HEART RATE: 75 BPM

## 2024-03-11 DIAGNOSIS — N76.0 BACTERIAL VAGINOSIS: ICD-10-CM

## 2024-03-11 DIAGNOSIS — N92.6 IRREGULAR MENSTRUAL CYCLE: Primary | ICD-10-CM

## 2024-03-11 DIAGNOSIS — B96.89 BACTERIAL VAGINOSIS: ICD-10-CM

## 2024-03-11 DIAGNOSIS — N76.0 ACUTE VAGINITIS: ICD-10-CM

## 2024-03-11 DIAGNOSIS — N76.1 CHRONIC VAGINITIS: ICD-10-CM

## 2024-03-11 DIAGNOSIS — L02.224 BOIL, GROIN: ICD-10-CM

## 2024-03-11 PROCEDURE — 87205 SMEAR GRAM STAIN: CPT

## 2024-03-11 PROCEDURE — 1036F TOBACCO NON-USER: CPT | Performed by: FAMILY MEDICINE

## 2024-03-11 PROCEDURE — 99214 OFFICE O/P EST MOD 30 MIN: CPT | Performed by: FAMILY MEDICINE

## 2024-03-11 RX ORDER — LEVONORGESTREL / ETHINYL ESTRADIOL AND ETHINYL ESTRADIOL 150-30(84)
1 KIT ORAL DAILY
Qty: 91 TABLET | Refills: 1 | Status: SHIPPED | OUTPATIENT
Start: 2024-03-11 | End: 2024-03-18

## 2024-03-11 RX ORDER — MUPIROCIN 20 MG/G
OINTMENT TOPICAL 3 TIMES DAILY
Qty: 22 G | Refills: 0 | Status: SHIPPED | OUTPATIENT
Start: 2024-03-11 | End: 2024-03-18 | Stop reason: SINTOL

## 2024-03-11 ASSESSMENT — ENCOUNTER SYMPTOMS
CHILLS: 0
DIAPHORESIS: 0
EYE ITCHING: 0
CHEST TIGHTNESS: 0
NAUSEA: 0
POLYPHAGIA: 0
RHINORRHEA: 0
APPETITE CHANGE: 0
SLEEP DISTURBANCE: 0
TROUBLE SWALLOWING: 0
SINUS PRESSURE: 0
COUGH: 0
SORE THROAT: 0
FREQUENCY: 0
ABDOMINAL PAIN: 0
ADENOPATHY: 0
SHORTNESS OF BREATH: 0
WOUND: 0
AGITATION: 0
FLANK PAIN: 0
NERVOUS/ANXIOUS: 0
VOICE CHANGE: 0
CONSTIPATION: 0
FATIGUE: 0
ARTHRALGIAS: 0
NECK STIFFNESS: 0
VOMITING: 0
BLOOD IN STOOL: 0
EYE PAIN: 0
COLOR CHANGE: 0
BRUISES/BLEEDS EASILY: 0
JOINT SWELLING: 0
ACTIVITY CHANGE: 0
FEVER: 0
HEMATURIA: 0
EYE DISCHARGE: 0
DIZZINESS: 0
DYSURIA: 0
WHEEZING: 0
BACK PAIN: 0
UNEXPECTED WEIGHT CHANGE: 0
FACIAL SWELLING: 0
SINUS PAIN: 0
POLYDIPSIA: 0
PALPITATIONS: 0
EYE REDNESS: 0
MYALGIAS: 0
DIARRHEA: 0

## 2024-03-11 NOTE — PROGRESS NOTES
Subjective   Patient ID: Mckinley De La Rosa is a 28 y.o. female who presents for Menstrual Problem (Light 1 week, heavy 3 days).  Today she is accompanied by alone.     HPI  Having irregular cycles that are lasting 2 weeks at a time and getting her cycles every 2 weeks.  Review of Systems   Constitutional:  Negative for activity change, appetite change, chills, diaphoresis, fatigue, fever and unexpected weight change.   HENT:  Negative for congestion, dental problem, drooling, ear discharge, ear pain, facial swelling, hearing loss, nosebleeds, postnasal drip, rhinorrhea, sinus pressure, sinus pain, sneezing, sore throat, tinnitus, trouble swallowing and voice change.    Eyes:  Negative for pain, discharge, redness, itching and visual disturbance.   Respiratory:  Negative for cough, chest tightness, shortness of breath and wheezing.    Cardiovascular:  Negative for chest pain, palpitations and leg swelling.   Gastrointestinal:  Negative for abdominal pain, blood in stool, constipation, diarrhea, nausea and vomiting.   Endocrine: Negative for cold intolerance, heat intolerance, polydipsia, polyphagia and polyuria.   Genitourinary:  Negative for decreased urine volume, dysuria, flank pain, frequency, hematuria and urgency.   Musculoskeletal:  Negative for arthralgias, back pain, gait problem, joint swelling, myalgias and neck stiffness.   Skin:  Negative for color change, pallor, rash and wound.   Neurological:  Negative for dizziness.   Hematological:  Negative for adenopathy. Does not bruise/bleed easily.   Psychiatric/Behavioral:  Negative for agitation, behavioral problems and sleep disturbance. The patient is not nervous/anxious.        Objective   Blood Pressure 105/73   Pulse 75   Weight 59.2 kg (130 lb 8 oz)   Last Menstrual Period 02/21/2024   Oxygen Saturation 96%   Body Mass Index 26.36 kg/m²   BSA: 1.57 meters squared  Growth percentiles: Facility age limit for growth %jose r is 20 years. Facility age limit for  growth %jose r is 20 years.     Physical Exam  Vitals and nursing note reviewed.   Constitutional:       General: She is not in acute distress.     Appearance: Normal appearance. She is not ill-appearing.   HENT:      Head: Normocephalic.      Right Ear: External ear normal.      Left Ear: External ear normal.      Nose: Nose normal.   Eyes:      Conjunctiva/sclera: Conjunctivae normal.   Cardiovascular:      Rate and Rhythm: Normal rate and regular rhythm.      Pulses: Normal pulses.      Heart sounds: Normal heart sounds.   Pulmonary:      Effort: Pulmonary effort is normal. No respiratory distress.      Breath sounds: Normal breath sounds. No wheezing.   Genitourinary:     Labia:         Right: Tenderness and lesion present.         Left: No rash, tenderness, lesion or injury.    Musculoskeletal:         General: Normal range of motion.      Cervical back: Normal range of motion.   Neurological:      General: No focal deficit present.      Mental Status: She is alert and oriented to person, place, and time.   Psychiatric:         Mood and Affect: Mood normal.         Behavior: Behavior normal.         Thought Content: Thought content normal.         Judgment: Judgment normal.         Assessment/Plan   Problem List Items Addressed This Visit    None  Visit Diagnoses       Diagnosis Codes    Irregular menstrual cycle    -  Primary N92.6    Relevant Medications    L norgest/e.estradioL-e.estrad (Seasonique) 0.15 mg-30 mcg (84)/10 mcg (7) tablets,dose pack,3 month tablet    Acute vaginitis     N76.0    Boil, groin     L02.224    Relevant Medications    mupirocin (Bactroban) 2 % ointment          F/U as scheduled  Use Dial body wash to the area daily to clean       Current Outpatient Medications   Medication Sig Dispense Refill    L norgest/e.estradioL-e.estrad (Seasonique) 0.15 mg-30 mcg (84)/10 mcg (7) tablets,dose pack,3 month tablet Take 1 tablet by mouth once daily. 91 tablet 1    mupirocin (Bactroban) 2 % ointment  Apply topically 3 times a day for 7 days. apply to affected area 22 g 0     No current facility-administered medications for this visit.

## 2024-03-11 NOTE — PATIENT INSTRUCTIONS
F/U as scheduled  Use Dial body wash to the area daily to clean     Current Outpatient Medications   Medication Sig Dispense Refill    L norgest/e.estradioL-e.estrad (Seasonique) 0.15 mg-30 mcg (84)/10 mcg (7) tablets,dose pack,3 month tablet Take 1 tablet by mouth once daily. 91 tablet 1    mupirocin (Bactroban) 2 % ointment Apply topically 3 times a day for 7 days. apply to affected area 22 g 0     No current facility-administered medications for this visit.

## 2024-03-12 LAB
CLUE CELLS VAG LPF-#/AREA: ABNORMAL /[LPF]
NUGENT SCORE: 7
YEAST VAG WET PREP-#/AREA: ABNORMAL

## 2024-03-12 NOTE — RESULT ENCOUNTER NOTE
Patient has bacterial vaginosis and would like her to get started on metronidazole 500 mg 1 tablet twice a day for 7 days

## 2024-03-13 RX ORDER — METRONIDAZOLE 500 MG/1
500 TABLET ORAL 2 TIMES DAILY
Qty: 14 TABLET | Refills: 0 | Status: SHIPPED | OUTPATIENT
Start: 2024-03-13 | End: 2024-03-20

## 2024-03-18 ENCOUNTER — OFFICE VISIT (OUTPATIENT)
Dept: PRIMARY CARE | Facility: CLINIC | Age: 29
End: 2024-03-18
Payer: MEDICAID

## 2024-03-18 VITALS
OXYGEN SATURATION: 96 % | DIASTOLIC BLOOD PRESSURE: 80 MMHG | BODY MASS INDEX: 26.13 KG/M2 | SYSTOLIC BLOOD PRESSURE: 115 MMHG | HEART RATE: 88 BPM | WEIGHT: 129.38 LBS

## 2024-03-18 DIAGNOSIS — M54.2 NECK PAIN, ACUTE: Primary | ICD-10-CM

## 2024-03-18 DIAGNOSIS — L73.9 FOLLICULITIS: ICD-10-CM

## 2024-03-18 DIAGNOSIS — M79.601 RIGHT ARM PAIN: ICD-10-CM

## 2024-03-18 DIAGNOSIS — N76.0 ACUTE VAGINITIS: ICD-10-CM

## 2024-03-18 PROCEDURE — 1036F TOBACCO NON-USER: CPT | Performed by: FAMILY MEDICINE

## 2024-03-18 PROCEDURE — 99214 OFFICE O/P EST MOD 30 MIN: CPT | Performed by: FAMILY MEDICINE

## 2024-03-18 RX ORDER — AMOXICILLIN AND CLAVULANATE POTASSIUM 875; 125 MG/1; MG/1
875 TABLET, FILM COATED ORAL 2 TIMES DAILY
Qty: 20 TABLET | Refills: 0 | Status: SHIPPED | OUTPATIENT
Start: 2024-03-18 | End: 2024-03-28

## 2024-03-18 ASSESSMENT — ENCOUNTER SYMPTOMS: NECK PAIN: 1

## 2024-03-18 NOTE — PATIENT INSTRUCTIONS
F/U if no improvement in 3-5 days    Current Outpatient Medications   Medication Sig Dispense Refill    metroNIDAZOLE (Flagyl) 500 mg tablet Take 1 tablet (500 mg) by mouth 2 times a day for 7 days. 14 tablet 0    amoxicillin-pot clavulanate (Augmentin) 875-125 mg tablet Take 1 tablet (875 mg) by mouth 2 times a day for 10 days. 20 tablet 0     No current facility-administered medications for this visit.

## 2024-03-18 NOTE — PROGRESS NOTES
Subjective   Patient ID: Mckinley De La Rosa is a 28 y.o. female who presents for Follow-up (Neck pain).  Today she is accompanied by alone.     Neck Pain     Patient presents to the office for a follow up for her left-sided neck pain. The neck pain started 3 months ago but is resolved now. She is now back to continuing her daily activities without pain. She did not take any muscle relaxants and she did not go to physical therapy.   Review of Systems   Musculoskeletal:  Positive for neck pain.     Would like us to take a look at that boil in the groin.  States that is getting bigger in the ointment is not helping.  After taking metronidazole her menstrual cycles did improve and is now thinking that the discolored discharge could have been from bacterial vaginosis.  Objective   Blood Pressure 115/80   Pulse 88   Weight 58.7 kg (129 lb 6 oz)   Last Menstrual Period 02/21/2024   Oxygen Saturation 96%   Body Mass Index 26.13 kg/m²   BSA: 1.56 meters squared  Growth percentiles: Facility age limit for growth %jose r is 20 years. Facility age limit for growth %jose r is 20 years.     Physical Exam  Vitals and nursing note reviewed.   Constitutional:       General: She is not in acute distress.     Appearance: Normal appearance. She is not ill-appearing.   HENT:      Head: Normocephalic.      Right Ear: External ear normal.      Left Ear: External ear normal.      Nose: Nose normal.   Eyes:      Conjunctiva/sclera: Conjunctivae normal.   Cardiovascular:      Rate and Rhythm: Normal rate and regular rhythm.      Pulses: Normal pulses.      Heart sounds: Normal heart sounds.   Pulmonary:      Effort: Pulmonary effort is normal. No respiratory distress.      Breath sounds: Normal breath sounds. No wheezing.   Genitourinary:     Comments: Has a pea-sized infected hair follicle on the left labia which is tender to touch.  No increased redness.  No warmth to touch.  Does have a white head.  No vaginal discharge noted.  Musculoskeletal:          General: Normal range of motion.      Right upper arm: Normal.      Left upper arm: Normal.      Right elbow: Normal.      Left elbow: Normal.      Right forearm: Normal.      Left forearm: Normal.      Right wrist: Normal.      Left wrist: Normal.      Cervical back: Normal and normal range of motion. No rigidity, spasms, tenderness or bony tenderness. No pain with movement. Normal range of motion.   Neurological:      General: No focal deficit present.      Mental Status: She is alert and oriented to person, place, and time.      Sensory: No sensory deficit.      Motor: No weakness.   Psychiatric:         Mood and Affect: Mood normal.         Behavior: Behavior normal.         Thought Content: Thought content normal.         Judgment: Judgment normal.       Normal range of motion,   Assessment/Plan   Problem List Items Addressed This Visit             ICD-10-CM    Neck pain, acute - Primary M54.2     resolved         Right arm pain M79.601     resolved         Acute vaginitis N76.0     Finish Metronidazole and let us know if the discharge comes back         Folliculitis L73.9    Relevant Medications    amoxicillin-pot clavulanate (Augmentin) 875-125 mg tablet     Current Outpatient Medications   Medication Sig Dispense Refill    amoxicillin-pot clavulanate (Augmentin) 875-125 mg tablet Take 1 tablet (875 mg) by mouth 2 times a day for 10 days. 20 tablet 0    fluconazole (Diflucan) 150 mg tablet Take 1 tablet if signs of vaginitis/yeast infection develops. May repeat in 1 week if symptoms persist. 2 tablet 0    mupirocin 2 % ointment kit Apply topically.      sulfamethoxazole-trimethoprim (Bactrim DS) 800-160 mg tablet Take 1 tablet by mouth 2 times a day for 5 days. 10 tablet 0     No current facility-administered medications for this visit.     F/U if no improvement in 3-5 days

## 2024-03-19 ENCOUNTER — OFFICE VISIT (OUTPATIENT)
Dept: URGENT CARE | Facility: CLINIC | Age: 29
End: 2024-03-19
Payer: MEDICAID

## 2024-03-19 VITALS
OXYGEN SATURATION: 98 % | DIASTOLIC BLOOD PRESSURE: 85 MMHG | TEMPERATURE: 98.2 F | SYSTOLIC BLOOD PRESSURE: 115 MMHG | HEART RATE: 90 BPM

## 2024-03-19 DIAGNOSIS — L02.214 ABSCESS OF LEFT GROIN: Primary | ICD-10-CM

## 2024-03-19 DIAGNOSIS — B37.31 CANDIDAL VAGINITIS: ICD-10-CM

## 2024-03-19 PROBLEM — L02.224 FURUNCLE OF GROIN: Status: RESOLVED | Noted: 2024-03-19 | Resolved: 2024-03-19

## 2024-03-19 PROCEDURE — 1036F TOBACCO NON-USER: CPT

## 2024-03-19 PROCEDURE — 99203 OFFICE O/P NEW LOW 30 MIN: CPT

## 2024-03-19 RX ORDER — SULFAMETHOXAZOLE AND TRIMETHOPRIM 800; 160 MG/1; MG/1
1 TABLET ORAL 2 TIMES DAILY
Qty: 10 TABLET | Refills: 0 | Status: SHIPPED | OUTPATIENT
Start: 2024-03-19 | End: 2024-03-24

## 2024-03-19 RX ORDER — FLUCONAZOLE 150 MG/1
TABLET ORAL
Qty: 2 TABLET | Refills: 0 | Status: SHIPPED | OUTPATIENT
Start: 2024-03-19

## 2024-03-19 ASSESSMENT — ENCOUNTER SYMPTOMS
DIARRHEA: 0
FEVER: 0
DIZZINESS: 0
ARTHRALGIAS: 0
COUGH: 0
PALPITATIONS: 0
VOMITING: 0
FLANK PAIN: 0
NAUSEA: 0
ABDOMINAL PAIN: 0
FATIGUE: 0
DYSURIA: 1
HEADACHES: 0
COLOR CHANGE: 0
CHILLS: 0
CONSTITUTIONAL NEGATIVE: 1
RESPIRATORY NEGATIVE: 1
MYALGIAS: 0
CARDIOVASCULAR NEGATIVE: 1
DIAPHORESIS: 0
GASTROINTESTINAL NEGATIVE: 1
SHORTNESS OF BREATH: 0
MUSCULOSKELETAL NEGATIVE: 1
NEUROLOGICAL NEGATIVE: 1
FREQUENCY: 0

## 2024-03-19 NOTE — PROGRESS NOTES
"Subjective     Mckinley De La Rosa is a 28 y.o. female who presents for Vaginal Pain.    Patient presents with painful \"pimple\" near her groin with itching and burning for the last 2 days. She reports that she tried using an antibiotic ointment which made the area worse. She reports recent treatment for BV.       History provided by:  Medical records and patient  Vaginal Pain  Associated symptoms: rash    Associated symptoms: no abdominal pain, no chest pain, no cough, no diarrhea, no fatigue, no fever, no headaches, no myalgias, no nausea, no shortness of breath and no vomiting        /85   Pulse 90   Temp 36.8 °C (98.2 °F)   LMP 02/21/2024   SpO2 98%    All vitals have been reviewed and are stable.    Review of Systems   Constitutional: Negative.  Negative for chills, diaphoresis, fatigue and fever.   Respiratory: Negative.  Negative for cough and shortness of breath.    Cardiovascular: Negative.  Negative for chest pain and palpitations.   Gastrointestinal: Negative.  Negative for abdominal pain, diarrhea, nausea and vomiting.   Genitourinary:  Positive for dysuria and vaginal discharge. Negative for decreased urine volume, flank pain, frequency, pelvic pain, urgency, vaginal bleeding and vaginal pain.   Musculoskeletal: Negative.  Negative for arthralgias and myalgias.   Skin:  Positive for rash. Negative for color change.   Neurological: Negative.  Negative for dizziness and headaches.       Objective   Physical Exam  Vitals and nursing note reviewed.   Constitutional:       General: She is awake. She is not in acute distress.     Appearance: Normal appearance. She is well-developed.   HENT:      Head: Normocephalic and atraumatic.      Nose: Nose normal.      Mouth/Throat:      Lips: Pink.      Mouth: Mucous membranes are moist.      Pharynx: Oropharynx is clear.   Eyes:      Extraocular Movements: Extraocular movements intact.      Conjunctiva/sclera: Conjunctivae normal.      Pupils: Pupils are equal, round, " and reactive to light.   Cardiovascular:      Rate and Rhythm: Normal rate and regular rhythm.   Pulmonary:      Effort: Pulmonary effort is normal. No respiratory distress.   Abdominal:      General: Abdomen is flat. There is no distension.   Musculoskeletal:         General: No swelling or deformity. Normal range of motion.      Cervical back: Normal range of motion.   Skin:     General: Skin is warm and dry.   Neurological:      General: No focal deficit present.      Mental Status: She is alert and oriented to person, place, and time. Mental status is at baseline.      Motor: Motor function is intact.      Coordination: Coordination is intact.   Psychiatric:         Mood and Affect: Mood and affect normal.         Speech: Speech normal.         Behavior: Behavior normal.         Thought Content: Thought content normal.         Judgment: Judgment normal.         Assessment/Plan   Problem List Items Addressed This Visit    None  Visit Diagnoses       Abscess of left groin    -  Primary    Relevant Medications    sulfamethoxazole-trimethoprim (Bactrim DS) 800-160 mg tablet    Antibiotic-induced yeast infection        Candidal vaginitis        Relevant Medications    fluconazole (Diflucan) 150 mg tablet            Red flags for reporting to ER have been reviewed with the patient.    Current diagnosis, any medication changes, and all in-office lab or radiologic results have been reviewed with the patient at the time of the visit.   If symptoms do not improve or worsen, patient is to follow up with PCP or report to the emergency room.   Patient is alert and oriented x3 and non-toxic appearing. Vital signs are stable.   Patient and/or guardian has sufficient decision-making capabilities at this time and reports understanding and agreement with the treatment plan made through shared decision-making.

## 2024-03-19 NOTE — PATIENT INSTRUCTIONS
GROIN ABSCESS / FOLLICULITIS     - BACTRIM was prescribed for oral antibiotic treatment of skin infection    - HIBICLENS (Chlorhexidine) antibiotic soap should be used to wash the skin at every shower     - Discussed that if symptoms are not improving or getting worse on antibiotics for 2-3 days, they may return to have the infection drained      - Keep wound CLEAN and DRY, covered with gauze or bandaid if needed    - Soapy water or Epsom salt soaks or warm compresses of the abscess may be performed 2-4 times per day to promote drainage and healing   - Once skin heals, neosporin or aquaphor may be used over top wound    - If fever, rash, increased pain, swelling. redness, or red streaks in the skin near the wound site occur, seek emergency care     VAGINITIS     - FLUCONAZOLE was prescribed for treatment of excess yeast/itchiness     - Chemical irritants (soaps, lotions, powders, sprays, scented panty liners) and allergens (latex, seminal fluid, chemical preservatives) can increase local sensitivity and risk of infection   - If symptoms worsen or do not improve in 3-6 days, follow up with PCP, if you need a referral, please call our office.

## 2024-03-21 PROBLEM — L73.9 FOLLICULITIS: Status: ACTIVE | Noted: 2024-03-21

## 2024-03-21 PROBLEM — N76.0 ACUTE VAGINITIS: Status: ACTIVE | Noted: 2024-03-21

## 2024-03-26 ENCOUNTER — APPOINTMENT (OUTPATIENT)
Dept: PRIMARY CARE | Facility: CLINIC | Age: 29
End: 2024-03-26
Payer: MEDICAID

## 2024-03-28 ENCOUNTER — CLINICAL SUPPORT (OUTPATIENT)
Dept: PRIMARY CARE | Facility: CLINIC | Age: 29
End: 2024-03-28
Payer: MEDICAID

## 2024-03-28 DIAGNOSIS — E53.8 VITAMIN B 12 DEFICIENCY: ICD-10-CM

## 2024-03-28 PROCEDURE — 96372 THER/PROPH/DIAG INJ SC/IM: CPT | Performed by: FAMILY MEDICINE

## 2024-03-28 RX ORDER — CYANOCOBALAMIN 1000 UG/ML
1000 INJECTION, SOLUTION INTRAMUSCULAR; SUBCUTANEOUS ONCE
Status: COMPLETED | OUTPATIENT
Start: 2024-03-28 | End: 2024-03-28

## 2024-03-28 RX ADMIN — CYANOCOBALAMIN 1000 MCG: 1000 INJECTION, SOLUTION INTRAMUSCULAR; SUBCUTANEOUS at 08:51

## 2024-03-28 NOTE — PROGRESS NOTES
Pt presents for routine B12 injection per Dr Brufmield. 1 mL given subcutaneously in upper L arm; no issues w/ injection. Pt tolerated well.

## 2024-04-25 ENCOUNTER — CLINICAL SUPPORT (OUTPATIENT)
Dept: PRIMARY CARE | Facility: CLINIC | Age: 29
End: 2024-04-25

## 2024-04-25 DIAGNOSIS — E53.8 VITAMIN B 12 DEFICIENCY: ICD-10-CM

## 2024-04-25 PROCEDURE — 96372 THER/PROPH/DIAG INJ SC/IM: CPT | Performed by: FAMILY MEDICINE

## 2024-04-25 RX ORDER — CYANOCOBALAMIN 1000 UG/ML
1000 INJECTION, SOLUTION INTRAMUSCULAR; SUBCUTANEOUS ONCE
Status: COMPLETED | OUTPATIENT
Start: 2024-04-25 | End: 2024-04-25

## 2024-04-25 RX ADMIN — CYANOCOBALAMIN 1000 MCG: 1000 INJECTION, SOLUTION INTRAMUSCULAR; SUBCUTANEOUS at 09:03

## 2024-04-25 NOTE — PROGRESS NOTES
Pt presents for routine B12 injection per Dr Brumfield. 1 mL given IM in L delt; no issues w/ injection. Pt tolerated well.

## 2024-05-23 ENCOUNTER — CLINICAL SUPPORT (OUTPATIENT)
Dept: PRIMARY CARE | Facility: CLINIC | Age: 29
End: 2024-05-23

## 2024-05-23 DIAGNOSIS — E53.8 VITAMIN B 12 DEFICIENCY: ICD-10-CM

## 2024-05-23 PROCEDURE — 96372 THER/PROPH/DIAG INJ SC/IM: CPT | Performed by: FAMILY MEDICINE

## 2024-05-23 RX ORDER — CYANOCOBALAMIN 1000 UG/ML
1000 INJECTION, SOLUTION INTRAMUSCULAR; SUBCUTANEOUS ONCE
Status: COMPLETED | OUTPATIENT
Start: 2024-05-23 | End: 2024-05-23

## 2024-05-23 RX ADMIN — CYANOCOBALAMIN 1000 MCG: 1000 INJECTION, SOLUTION INTRAMUSCULAR; SUBCUTANEOUS at 09:46

## 2024-06-20 ENCOUNTER — TELEPHONE (OUTPATIENT)
Dept: PRIMARY CARE | Facility: CLINIC | Age: 29
End: 2024-06-20

## 2024-06-20 ENCOUNTER — APPOINTMENT (OUTPATIENT)
Dept: PRIMARY CARE | Facility: CLINIC | Age: 29
End: 2024-06-20

## 2024-06-20 DIAGNOSIS — E53.8 VITAMIN B 12 DEFICIENCY: ICD-10-CM

## 2024-06-20 PROCEDURE — 96372 THER/PROPH/DIAG INJ SC/IM: CPT | Performed by: FAMILY MEDICINE

## 2024-06-20 RX ORDER — CYANOCOBALAMIN 1000 UG/ML
1000 INJECTION, SOLUTION INTRAMUSCULAR; SUBCUTANEOUS
Qty: 3 ML | Refills: 1 | Status: SHIPPED | OUTPATIENT
Start: 2024-06-20

## 2024-06-20 RX ORDER — CYANOCOBALAMIN 1000 UG/ML
1000 INJECTION, SOLUTION INTRAMUSCULAR; SUBCUTANEOUS ONCE
Status: COMPLETED | OUTPATIENT
Start: 2024-06-20 | End: 2024-06-20

## 2024-06-20 NOTE — PROGRESS NOTES
Pt presents for routine B12 injection per Dr Brumfield. 1 mL given IM in L delt; no issues w/ injection. Pt tolerated well.  Pt would like supplies to have her B12 shots given to her at home since she does not have ins at this time.   I told her that I would ask the Dr & send the supplies if Dr ok'd it.

## 2024-06-20 NOTE — TELEPHONE ENCOUNTER
Pt was here for her B12 injection & said that her sister could possibly give her injections at home since pt does not have insurance at this time & it is costing her $60+ to come in for nurse appts monthly.  I told her that I would ask you if it was ok to order the supplies & get them sent to her retail pharmacy.

## 2024-07-18 ENCOUNTER — APPOINTMENT (OUTPATIENT)
Dept: PRIMARY CARE | Facility: CLINIC | Age: 29
End: 2024-07-18

## 2024-07-18 DIAGNOSIS — E53.8 VITAMIN B 12 DEFICIENCY: ICD-10-CM

## 2024-07-18 PROCEDURE — 96372 THER/PROPH/DIAG INJ SC/IM: CPT | Performed by: FAMILY MEDICINE

## 2024-07-18 RX ORDER — CYANOCOBALAMIN 1000 UG/ML
1000 INJECTION, SOLUTION INTRAMUSCULAR; SUBCUTANEOUS ONCE
Status: COMPLETED | OUTPATIENT
Start: 2024-07-18 | End: 2024-07-18

## 2024-12-16 ENCOUNTER — TELEPHONE (OUTPATIENT)
Dept: PRIMARY CARE | Facility: CLINIC | Age: 29
End: 2024-12-16

## 2024-12-16 DIAGNOSIS — E53.8 VITAMIN B 12 DEFICIENCY: ICD-10-CM

## 2024-12-16 RX ORDER — CYANOCOBALAMIN 1000 UG/ML
1000 INJECTION, SOLUTION INTRAMUSCULAR; SUBCUTANEOUS
Qty: 3 ML | Refills: 1 | OUTPATIENT
Start: 2024-12-16

## 2024-12-16 NOTE — TELEPHONE ENCOUNTER
Pt called in requesting a refill on:    cyanocobalamin (Vitamin B-12) injection 1,000 mcg   Quantity: 1     [facility] - [place], [place] - [address]   P: [phone]

## 2025-05-14 ENCOUNTER — APPOINTMENT (OUTPATIENT)
Dept: PRIMARY CARE | Facility: CLINIC | Age: 30
End: 2025-05-14

## 2025-05-14 VITALS
WEIGHT: 127.8 LBS | BODY MASS INDEX: 25.76 KG/M2 | HEIGHT: 59 IN | SYSTOLIC BLOOD PRESSURE: 110 MMHG | OXYGEN SATURATION: 99 % | HEART RATE: 93 BPM | TEMPERATURE: 98.6 F | DIASTOLIC BLOOD PRESSURE: 77 MMHG

## 2025-05-14 DIAGNOSIS — M25.511 ACUTE PAIN OF RIGHT SHOULDER: ICD-10-CM

## 2025-05-14 DIAGNOSIS — M54.2 NECK PAIN: ICD-10-CM

## 2025-05-14 DIAGNOSIS — M79.601 RIGHT ARM PAIN: Primary | ICD-10-CM

## 2025-05-14 DIAGNOSIS — E53.8 VITAMIN B 12 DEFICIENCY: ICD-10-CM

## 2025-05-14 PROCEDURE — 3008F BODY MASS INDEX DOCD: CPT | Performed by: FAMILY MEDICINE

## 2025-05-14 PROCEDURE — 99214 OFFICE O/P EST MOD 30 MIN: CPT | Performed by: FAMILY MEDICINE

## 2025-05-14 RX ORDER — CYCLOBENZAPRINE HCL 5 MG
5 TABLET ORAL 2 TIMES DAILY PRN
Qty: 30 TABLET | Refills: 0 | Status: SHIPPED | OUTPATIENT
Start: 2025-05-14 | End: 2025-07-13

## 2025-05-14 RX ORDER — CYANOCOBALAMIN 1000 UG/ML
1000 INJECTION, SOLUTION INTRAMUSCULAR; SUBCUTANEOUS
Qty: 3 ML | Refills: 1 | Status: SHIPPED | OUTPATIENT
Start: 2025-05-14

## 2025-05-14 RX ORDER — METHYLPREDNISOLONE 4 MG/1
TABLET ORAL
Qty: 21 TABLET | Refills: 0 | Status: SHIPPED | OUTPATIENT
Start: 2025-05-14 | End: 2025-05-20

## 2025-05-14 ASSESSMENT — ENCOUNTER SYMPTOMS
DEPRESSION: 0
LOSS OF SENSATION IN FEET: 0
OCCASIONAL FEELINGS OF UNSTEADINESS: 0

## 2025-05-14 NOTE — ASSESSMENT & PLAN NOTE
Orders:    cyclobenzaprine (Flexeril) 5 mg tablet; Take 1 tablet (5 mg) by mouth 2 times a day as needed for muscle spasms.    methylPREDNISolone (Medrol Dospak) 4 mg tablets; Take as directed on package.

## 2025-05-14 NOTE — PROGRESS NOTES
"Subjective   Patient ID: Mckinley De La Rosa is a 29 y.o. female who presents for Follow-up (Medication review ).  History of Present Illness  Mckinley De La Rosa is a 29 year old female who presents with right shoulder and arm pain.    She experiences heaviness and tiredness in her right shoulder and upper arm, with pain sometimes extending to the elbow but not to the wrist. There is no numbness or tingling. She can perform daily activities without pain, although lifting the arm results in tiredness. No neck pain or injury is reported.    Her work as a  involves massaging during pedicures, which may contribute to the discomfort. Being right-handed, she uses her right arm more frequently, which could be a factor in her symptoms.    She has not taken any medication for the pain but has occasionally asked her friend and sister to massage the area, which sometimes provides relief. She recalls having similar issues in the past, which were attributed to neck problems identified in an x-ray two years ago.    She mentions noticing some brown dots on her neck, which are not itchy. She has not started any new soaps, shampoos, or creams recently and does not use sunscreen on her neck.    Review of Systems  ROS otherwise negative aside from what was mentioned above in HPI.    Objective     /77   Pulse 93   Temp 37 °C (98.6 °F) (Oral)   Ht 1.499 m (4' 11\")   Wt 58 kg (127 lb 12.8 oz)   SpO2 99%   BMI 25.81 kg/m²      Physical Exam  GENERAL: Alert, cooperative, well developed, no acute distress.  HEENT: Normocephalic, normal oropharynx, moist mucous membranes.  CHEST: Clear to auscultation bilaterally, no wheezes, rhonchi, or crackles.  CARDIOVASCULAR: Normal heart rate and rhythm, S1 and S2 normal without murmurs.  ABDOMEN: Soft, non-tender, non-distended, without organomegaly, normal bowel sounds.  EXTREMITIES: No cyanosis or edema.  MUSCULOSKELETAL: Right shoulder normal range of motion and strength 5/5. Neck normal " range of motion, no tenderness on palpation. Right trapezius muscle spasm and tender on palpation.  NEUROLOGICAL: Cranial nerves grossly intact, moves all extremities without gross motor or sensory deficit.  SKIN: Brown dots on skin, possibly moles.    Assessment & Plan  Cervical radiculopathy  Chronic cervical radiculopathy with symptoms of heaviness and fatigue in the right upper arm, likely due to nerve impingement. Previous neck x-rays indicated issues causing right arm pain. Current symptoms include muscle tightness in the right trapezius and heaviness in the arm without significant pain or range of motion limitations. No insurance limits diagnostic and therapeutic options. Discussed potential insurance options including Medicaid, [employer], [employer], and [employer] to facilitate further diagnostic imaging and physical therapy if needed.  - Provide exercises for home therapy to alleviate symptoms.  - Prescribe a muscle relaxant to be taken up to two times a day as needed for muscle spasm, with caution for potential drowsiness.  - Advise on obtaining insurance to facilitate further diagnostic imaging and physical therapy if needed.    Muscle spasm of right trapezius  Muscle spasm in the right trapezius muscle, contributing to the sensation of heaviness in the right arm. Likely related to cervical radiculopathy and occupational strain from work as a .  - Prescribe a muscle relaxant to be taken up to two times a day as needed for muscle spasm, with caution for potential drowsiness.  - Provide exercises for home therapy to alleviate symptoms.    Skin lesions possibly due to eczema  Presence of skin lesions on the neck, possibly due to eczema or irritation from friction or jewelry. No itchiness reported. No new soaps or creams used.  - Prescribe a topical cream to apply to the affected area and monitor for improvement.  - Advise on the use of sunscreen on the neck to prevent  "further irritation.    Assessment & Plan  Vitamin B 12 deficiency    Orders:    cyanocobalamin (Vitamin B-12) 1,000 mcg/mL injection; Inject 1 mL (1,000 mcg) into the muscle every 30 (thirty) days.    syringe with needle, safety 1 mL 25 gauge x 1\" syringe; Pt to inject 1 mL of B12 IM monthly    Right arm pain    Orders:    cyclobenzaprine (Flexeril) 5 mg tablet; Take 1 tablet (5 mg) by mouth 2 times a day as needed for muscle spasms.    methylPREDNISolone (Medrol Dospak) 4 mg tablets; Take as directed on package.    Acute pain of right shoulder    Orders:    cyclobenzaprine (Flexeril) 5 mg tablet; Take 1 tablet (5 mg) by mouth 2 times a day as needed for muscle spasms.    methylPREDNISolone (Medrol Dospak) 4 mg tablets; Take as directed on package.    Neck pain    Orders:    cyclobenzaprine (Flexeril) 5 mg tablet; Take 1 tablet (5 mg) by mouth 2 times a day as needed for muscle spasms.    methylPREDNISolone (Medrol Dospak) 4 mg tablets; Take as directed on package.      Current Outpatient Medications   Medication Instructions    cyanocobalamin (VITAMIN B-12) 1,000 mcg, intramuscular, Every 30 days    cyclobenzaprine (FLEXERIL) 5 mg, oral, 2 times daily PRN    methylPREDNISolone (Medrol Dospak) 4 mg tablets Take as directed on package.    syringe with needle, safety 1 mL 25 gauge x 1\" syringe Pt to inject 1 mL of B12 IM monthly     Current Outpatient Medications   Medication Sig Dispense Refill    cyanocobalamin (Vitamin B-12) 1,000 mcg/mL injection Inject 1 mL (1,000 mcg) into the muscle every 30 (thirty) days. 3 mL 1    cyclobenzaprine (Flexeril) 5 mg tablet Take 1 tablet (5 mg) by mouth 2 times a day as needed for muscle spasms. 30 tablet 0    methylPREDNISolone (Medrol Dospak) 4 mg tablets Take as directed on package. 21 tablet 0    syringe with needle, safety 1 mL 25 gauge x 1\" syringe Pt to inject 1 mL of B12 IM monthly 12 each 0     No current facility-administered medications for this visit. "       Results  RADIOLOGY  Neck X-ray: Cervical spine abnormalities causing radicular pain in the right arm        Ariane Brumfield MD     This medical note was created with the assistance of artificial intelligence (AI) for documentation purposes. The content has been reviewed and confirmed by the healthcare provider for accuracy and completeness. Patient consented to the use of audio recording and use of AI during their visit.

## 2025-06-13 ENCOUNTER — OFFICE VISIT (OUTPATIENT)
Dept: PRIMARY CARE | Facility: CLINIC | Age: 30
End: 2025-06-13

## 2025-06-13 VITALS
OXYGEN SATURATION: 98 % | HEART RATE: 85 BPM | TEMPERATURE: 97.5 F | DIASTOLIC BLOOD PRESSURE: 66 MMHG | SYSTOLIC BLOOD PRESSURE: 104 MMHG | WEIGHT: 125 LBS | BODY MASS INDEX: 25.25 KG/M2

## 2025-06-13 DIAGNOSIS — L73.9 FOLLICULITIS: Primary | ICD-10-CM

## 2025-06-13 PROCEDURE — 99213 OFFICE O/P EST LOW 20 MIN: CPT | Performed by: FAMILY MEDICINE

## 2025-06-13 RX ORDER — DOXYCYCLINE 100 MG/1
100 CAPSULE ORAL 2 TIMES DAILY
Qty: 20 CAPSULE | Refills: 0 | Status: SHIPPED | OUTPATIENT
Start: 2025-06-13 | End: 2025-06-13 | Stop reason: WASHOUT

## 2025-06-13 RX ORDER — CEPHALEXIN 500 MG/1
500 CAPSULE ORAL 2 TIMES DAILY
Qty: 20 CAPSULE | Refills: 0 | Status: SHIPPED | OUTPATIENT
Start: 2025-06-13 | End: 2025-06-23

## 2025-06-13 ASSESSMENT — ENCOUNTER SYMPTOMS
PHOTOPHOBIA: 0
OCCASIONAL FEELINGS OF UNSTEADINESS: 0
ACTIVITY CHANGE: 0
FEVER: 0
EYE ITCHING: 0
COLOR CHANGE: 1
DIAPHORESIS: 0
WOUND: 1
LOSS OF SENSATION IN FEET: 0
SHORTNESS OF BREATH: 0
DEPRESSION: 0
CHEST TIGHTNESS: 0

## 2025-06-13 NOTE — PROGRESS NOTES
Subjective   Patient ID: Mckinley De La Rosa is a 29 y.o. female who presents for sore spot near left groin area (it's painful and itchy).    Patient presents has noticed pimple-like on the inside of the left thigh area.  She has had these episodically.  There is no new clothing that she has been wearing she has had no vaginal discharge no pain with urination or other change.    She basically opened the area got some drainage out of it.  A little bit of itching is been noted as well.  There is been no fever no chills.    Patient states that there is no way she could be pregnant.  She has not been sexually active in the last 3 months and has had regular menstrual cycles.     patient having a sore spot near the left groin it is painful somewhat itching.    Review of Systems   Constitutional:  Negative for activity change, diaphoresis and fever.   HENT:  Negative for dental problem and ear discharge.    Eyes:  Negative for photophobia and itching.   Respiratory:  Negative for chest tightness and shortness of breath.    Skin:  Positive for color change, rash and wound.       Objective   /66   Pulse 85   Temp 36.4 °C (97.5 °F)   Wt 56.7 kg (125 lb)   LMP 05/20/2025 (Approximate)   SpO2 98%   BMI 25.25 kg/m²   BSA Body surface area is 1.54 meters squared.    Martha is chaparone  Physical Exam  Exam conducted with a chaperone present.   Constitutional:       Appearance: Normal appearance.   HENT:      Head: Normocephalic.   Cardiovascular:      Rate and Rhythm: Normal rate and regular rhythm.   Pulmonary:      Effort: Pulmonary effort is normal.      Breath sounds: Normal breath sounds.   Abdominal:      General: Abdomen is flat.   Skin:     Comments: Small area of folliculitis noted on the inner aspect of the left thigh area.  It is not affecting the labia       Neurological:      Mental Status: She is alert.       No visits with results within 1 Year(s) from this visit.   Latest known visit with results is:   Office  "Visit on 03/11/2024   Component Date Value Ref Range Status    Triston Score 03/11/2024 7 (H)  0 - 3 Final    Interpretation of the Triston Score  0-3.....Normal vaginal microbiota  4-6.....Intermediate results  7-10....Bacterial vaginosis    Yeast 03/11/2024 ABSENT  ABSENT Final    Clue Cells 03/11/2024 ABSENT  ABSENT Final     Medications Ordered Prior to Encounter[1]  No images are attached to the encounter.            Assessment/Plan   Problem List Items Addressed This Visit           ICD-10-CM    Folliculitis - Primary L73.9    Relevant Medications    doxycycline (Vibramycin) 100 mg capsule                 [1]   Current Outpatient Medications on File Prior to Visit   Medication Sig Dispense Refill    cyanocobalamin (Vitamin B-12) 1,000 mcg/mL injection Inject 1 mL (1,000 mcg) into the muscle every 30 (thirty) days. 3 mL 1    cyclobenzaprine (Flexeril) 5 mg tablet Take 1 tablet (5 mg) by mouth 2 times a day as needed for muscle spasms. 30 tablet 0    syringe with needle, safety 1 mL 25 gauge x 1\" syringe Pt to inject 1 mL of B12 IM monthly 12 each 0     No current facility-administered medications on file prior to visit.     "

## 2025-06-13 NOTE — PATIENT INSTRUCTIONS
Treating for folliculitis.  Please start antibiotic as directed please avoid direct sunlight to include have side effect of sun sensitivity.    Culture has been performed of the area.    Please call in the next week to let us know how you are doing.    Any worsening symptoms such as increased redness warmth or pain please let us know.

## 2025-06-15 LAB
BACTERIA SPEC AEROBE CULT: NORMAL
BACTERIA SPEC ANAEROBE CULT: NORMAL

## 2025-06-19 LAB
BACTERIA SPEC AEROBE CULT: NORMAL
BACTERIA SPEC ANAEROBE CULT: NORMAL

## 2025-08-12 DIAGNOSIS — E53.8 VITAMIN B 12 DEFICIENCY: ICD-10-CM

## 2025-08-12 RX ORDER — CYANOCOBALAMIN 1000 UG/ML
1000 INJECTION, SOLUTION INTRAMUSCULAR; SUBCUTANEOUS
Qty: 1 ML | Refills: 2 | Status: SHIPPED | OUTPATIENT
Start: 2025-08-12